# Patient Record
Sex: FEMALE | Race: WHITE | URBAN - METROPOLITAN AREA
[De-identification: names, ages, dates, MRNs, and addresses within clinical notes are randomized per-mention and may not be internally consistent; named-entity substitution may affect disease eponyms.]

---

## 2021-03-25 ENCOUNTER — OFFICE VISIT (OUTPATIENT)
Dept: INTERNAL MEDICINE CLINIC | Age: 26
End: 2021-03-25
Payer: COMMERCIAL

## 2021-03-25 VITALS
SYSTOLIC BLOOD PRESSURE: 122 MMHG | TEMPERATURE: 98.1 F | RESPIRATION RATE: 16 BRPM | DIASTOLIC BLOOD PRESSURE: 68 MMHG | WEIGHT: 124 LBS | HEIGHT: 64 IN | OXYGEN SATURATION: 98 % | HEART RATE: 74 BPM | BODY MASS INDEX: 21.17 KG/M2

## 2021-03-25 DIAGNOSIS — Z11.59 SCREENING FOR VIRAL DISEASE: ICD-10-CM

## 2021-03-25 DIAGNOSIS — K50.10 CROHN'S DISEASE OF COLON WITHOUT COMPLICATION (HCC): Primary | ICD-10-CM

## 2021-03-25 DIAGNOSIS — Z00.00 ROUTINE CHECK-UP: ICD-10-CM

## 2021-03-25 DIAGNOSIS — Z80.0 FAMILY HISTORY OF RECTAL CANCER: ICD-10-CM

## 2021-03-25 PROCEDURE — G8484 FLU IMMUNIZE NO ADMIN: HCPCS | Performed by: INTERNAL MEDICINE

## 2021-03-25 PROCEDURE — 90715 TDAP VACCINE 7 YRS/> IM: CPT | Performed by: INTERNAL MEDICINE

## 2021-03-25 PROCEDURE — G8420 CALC BMI NORM PARAMETERS: HCPCS | Performed by: INTERNAL MEDICINE

## 2021-03-25 PROCEDURE — G8428 CUR MEDS NOT DOCUMENT: HCPCS | Performed by: INTERNAL MEDICINE

## 2021-03-25 PROCEDURE — 90471 IMMUNIZATION ADMIN: CPT | Performed by: INTERNAL MEDICINE

## 2021-03-25 PROCEDURE — 99204 OFFICE O/P NEW MOD 45 MIN: CPT | Performed by: INTERNAL MEDICINE

## 2021-03-25 PROCEDURE — 1036F TOBACCO NON-USER: CPT | Performed by: INTERNAL MEDICINE

## 2021-03-25 RX ORDER — NORGESTIMATE AND ETHINYL ESTRADIOL 0.25-0.035
1 KIT ORAL DAILY
COMMUNITY
Start: 2021-03-07 | End: 2021-08-18 | Stop reason: SDUPTHER

## 2021-03-25 SDOH — ECONOMIC STABILITY: TRANSPORTATION INSECURITY
IN THE PAST 12 MONTHS, HAS THE LACK OF TRANSPORTATION KEPT YOU FROM MEDICAL APPOINTMENTS OR FROM GETTING MEDICATIONS?: NOT ASKED

## 2021-03-25 SDOH — ECONOMIC STABILITY: TRANSPORTATION INSECURITY
IN THE PAST 12 MONTHS, HAS LACK OF TRANSPORTATION KEPT YOU FROM MEETINGS, WORK, OR FROM GETTING THINGS NEEDED FOR DAILY LIVING?: NOT ASKED

## 2021-03-25 ASSESSMENT — PATIENT HEALTH QUESTIONNAIRE - PHQ9
SUM OF ALL RESPONSES TO PHQ QUESTIONS 1-9: 0
SUM OF ALL RESPONSES TO PHQ QUESTIONS 1-9: 0
SUM OF ALL RESPONSES TO PHQ9 QUESTIONS 1 & 2: 0
SUM OF ALL RESPONSES TO PHQ QUESTIONS 1-9: 0

## 2021-03-25 ASSESSMENT — ENCOUNTER SYMPTOMS
DIARRHEA: 0
NAUSEA: 0
COUGH: 0
SHORTNESS OF BREATH: 0
TROUBLE SWALLOWING: 0
RHINORRHEA: 0
SORE THROAT: 0
ABDOMINAL PAIN: 0

## 2021-03-25 NOTE — PROGRESS NOTES
Rafael Rivera (:  1995) is a 22 y.o. female, here for evaluation of the following chief complaint(s):    Established New Doctor      ASSESSMENT/PLAN:  1. Crohn's disease of colon without complication [facility])  Patient was diagnosed with Crohn's in  and briefly took medication. Her symptoms have been quiet although she may have had mild symptoms for a day earlier this year. Advised getting established with gastroenterology in [facility] Julisa Cook MD, Gastroenterology, [facility]  -     CBC; Future  2. Family history of rectal cancer  -     AFL - Julisa Cook MD, Gastroenterology, [facility]  3. Screening for viral disease  -     HIV-1 AND HIV-2 ANTIBODIES; Future  -     HEPATITIS C ANTIBODY; Future  4. Routine check-up  Tdap today, refer gyn to get established  -     Lipid Panel; Future  -     Comprehensive Metabolic Panel, Fasting; Future  -     Vitamin D 25 Hydroxy; Future      Return in about 1 year (around 3/25/2022). SUBJECTIVE/OBJECTIVE:  HPI   New patient, here to get established. Originally from [place], then lived in [place], and in [place] since 2020. She feels well. She was diagnosed with Crohn's disease in  and I was able to review the pathology report. Her symptoms have been very quiet for many years. She describes an episode of lower abdominal pain few months ago that lasted for about a day that was reminiscent of her Crohn's pain. She denies weight loss or blood in stool. She has about 2 bowel movements per day. Review of Systems   Constitutional: Negative for fatigue and fever. HENT: Negative for rhinorrhea, sore throat and trouble swallowing. Eyes: Negative for visual disturbance. Respiratory: Negative for cough and shortness of breath. Cardiovascular: Negative for chest pain and palpitations. Gastrointestinal: Negative for abdominal pain, diarrhea and nausea.    Genitourinary: Negative for decreased urine volume, dysuria and frequency. Musculoskeletal: Negative for arthralgias and myalgias. Skin: Negative for rash. Allergic/Immunologic: Negative for immunocompromised state. Neurological: Negative for dizziness, numbness and headaches. Hematological: Does not bruise/bleed easily. Psychiatric/Behavioral: Negative for dysphoric mood and sleep disturbance. The patient is not nervous/anxious. Past Medical History:   Diagnosis Date    Crohn's disease of colon with complication ([address]     dxd 10/13, briefly took meds, then sxs resolved       Current Outpatient Medications   Medication Sig Dispense Refill    CRISTOBAL 0.25-35 MG-MCG per tablet Take 1 tablet by mouth daily       No current facility-administered medications for this visit. Physical Exam  Vitals signs and nursing note reviewed. Constitutional:       General: She is not in acute distress. Appearance: She is well-developed. HENT:      Head: Normocephalic and atraumatic. Right Ear: External ear normal.      Left Ear: External ear normal.   Eyes:      General: No scleral icterus. Extraocular Movements: Extraocular movements intact. Neck:      Thyroid: No thyromegaly. Cardiovascular:      Rate and Rhythm: Normal rate and regular rhythm. Heart sounds: No murmur. Pulmonary:      Effort: No respiratory distress. Breath sounds: Normal breath sounds. No wheezing or rales. Abdominal:      General: Bowel sounds are normal. There is no distension. Palpations: Abdomen is soft. Tenderness: There is no abdominal tenderness. Musculoskeletal: Normal range of motion. Lymphadenopathy:      Cervical: No cervical adenopathy. Skin:     General: Skin is warm and dry. Neurological:      Mental Status: She is alert and oriented to person, place, and time. Cranial Nerves: No cranial nerve deficit. Sensory: No sensory deficit.       Coordination: Coordination normal.   Psychiatric:         Behavior: Behavior normal.       I have spent 45 minutes face to face with patient, greater than 50% counseling and/or coordinating care for    This note was generated completely or in part utilizing Dragon dictation speech recognition software. Occasionally, words are mistranscribed and despite editing, the text may contain inaccuracies due to incorrect word recognition. If further clarification is needed please contact the office at [phone]          An electronic signature was used to authenticate this note.     --Nedra Agustin MD

## 2021-08-16 ENCOUNTER — TELEPHONE (OUTPATIENT)
Dept: INTERNAL MEDICINE CLINIC | Age: 26
End: 2021-08-16

## 2021-08-16 ENCOUNTER — OFFICE VISIT (OUTPATIENT)
Dept: INTERNAL MEDICINE CLINIC | Age: 26
End: 2021-08-16
Payer: COMMERCIAL

## 2021-08-16 VITALS
DIASTOLIC BLOOD PRESSURE: 60 MMHG | WEIGHT: 130 LBS | TEMPERATURE: 98.2 F | HEART RATE: 64 BPM | BODY MASS INDEX: 22.31 KG/M2 | SYSTOLIC BLOOD PRESSURE: 104 MMHG

## 2021-08-16 DIAGNOSIS — H69.81 DYSFUNCTION OF RIGHT EUSTACHIAN TUBE: Primary | ICD-10-CM

## 2021-08-16 PROCEDURE — 99213 OFFICE O/P EST LOW 20 MIN: CPT | Performed by: INTERNAL MEDICINE

## 2021-08-16 PROCEDURE — G8427 DOCREV CUR MEDS BY ELIG CLIN: HCPCS | Performed by: INTERNAL MEDICINE

## 2021-08-16 PROCEDURE — 1036F TOBACCO NON-USER: CPT | Performed by: INTERNAL MEDICINE

## 2021-08-16 PROCEDURE — G8420 CALC BMI NORM PARAMETERS: HCPCS | Performed by: INTERNAL MEDICINE

## 2021-08-16 RX ORDER — USTEKINUMAB 90 MG/ML
INJECTION, SOLUTION SUBCUTANEOUS
COMMUNITY
Start: 2021-07-07

## 2021-08-16 RX ORDER — FLUTICASONE PROPIONATE 50 MCG
2 SPRAY, SUSPENSION (ML) NASAL DAILY
Qty: 1 BOTTLE | Refills: 11 | Status: SHIPPED | OUTPATIENT
Start: 2021-08-16 | End: 2022-08-22

## 2021-08-16 NOTE — TELEPHONE ENCOUNTER
----- Message from Efra Wellington sent at 8/16/2021 10:41 AM EDT -----  Subject: Appointment Request    Reason for Call: Ear Problem    QUESTIONS  Type of Appointment? Established Patient  Reason for appointment request? No appointments available during search  Additional Information for Provider? patient is experiencing pain in right   ear , when yawn it hurts and when blow nose sound like a squeaky sound   requesting an appointment soon as possible .  ---------------------------------------------------------------------------  --------------  CALL BACK INFO  What is the best way for the office to contact you? OK to leave message on   voicemail  Preferred Call Back Phone Number? [phone]  ---------------------------------------------------------------------------  --------------  SCRIPT ANSWERS  Relationship to Patient? Self  Did you have an injury or trauma within the past three days? No  Is your pain affecting your daily activities? No  Are you having fevers (100.4), chills or sweats? No  Are you experiencing new onset hearing loss? No  Did your symptoms begin within the last 2 weeks? Yes  Have you been diagnosed with, awaiting test results for, or told that you   are suspected of having COVID-19 (Coronavirus)? (If patient has tested   negative or was tested as a requirement for work, school, or travel and   not based on symptoms, answer no)? No  Do you currently have flu-like symptoms including fever or chills, cough,   shortness of breath, difficulty breathing, or new loss of taste or smell? No  Have you had close contact with someone with COVID-19 in the last 14 days? No  (Service Expert  click yes below to proceed with [facility] As Usual   Scheduling)?  Yes

## 2021-08-16 NOTE — PROGRESS NOTES
Lisa Redman (:  1995) is a 22 y.o. female, here for evaluation of the following chief complaint(s):    Otalgia (Right ear pain started over the weekend. Noticed \"squeaking\" in ear when blowing nos)      ASSESSMENT/PLAN:  1. Dysfunction of right eustachian tube  -     fluticasone (FLONASE) 50 MCG/ACT nasal spray; 2 sprays by Nasal route daily, Disp-1 Bottle, R-11Normal  Claritin - D or Zyrtec D OTC for next few days  Call if symptoms persist.  Return if symptoms worsen or fail to improve. SUBJECTIVE/OBJECTIVE:  HPI   Patient complains of right ear discomfort that started over the weekend. She states that she heard a squeaky sound in both ears last week. She did have some recent air travel out [facility].    She denies sore throat. She was having some yellow nasal discharge from the nose but it is now clear. She denies fever or chills. She denies hearing loss. She is unsure if she may have had ear tubes as a child. Past Medical History:   Diagnosis Date    [address]'s disease of colon with complication ([facility] [address]     dxd 10/13, briefly took meds, then sxs resolved       Current Outpatient Medications   Medication Sig Dispense Refill    STELARA 90 MG/ML SOSY prefilled syringe       fluticasone (FLONASE) 50 MCG/ACT nasal spray 2 sprays by Nasal route daily 1 Bottle 11    CRISTOBAL 0.25-35 MG-MCG per tablet Take 1 tablet by mouth daily       No current facility-administered medications for this visit. Physical Exam  Vitals and nursing note reviewed. Constitutional:       General: She is not in acute distress. Appearance: She is well-developed. She is not diaphoretic. HENT:      Right Ear: Ear canal and external ear normal. Tympanic membrane is scarred and bulging (mild). Left Ear: Tympanic membrane, ear canal and external ear normal.      Nose: Nose normal.      Mouth/Throat:      Pharynx: No oropharyngeal exudate. Eyes:      General:         Right eye: No discharge.          Left eye: No discharge. Conjunctiva/sclera: Conjunctivae normal.   Cardiovascular:      Rate and Rhythm: Normal rate and regular rhythm. Pulmonary:      Effort: No respiratory distress. Breath sounds: Normal breath sounds. Lymphadenopathy:      Cervical: No cervical adenopathy. Neurological:      Mental Status: She is alert and oriented to person, place, and time. This note was generated completely or in part utilizing Dragon dictation speech recognition software. Occasionally, words are mistranscribed and despite editing, the text may contain inaccuracies due to incorrect word recognition. If further clarification is needed please contact the office at [phone]          An electronic signature was used to authenticate this note.     --Laura Perez MD

## 2021-08-16 NOTE — TELEPHONE ENCOUNTER
----- Message from Citlali Signs sent at 8/16/2021 10:45 AM EDT -----  Subject: Message to Provider    QUESTIONS  Information for Provider? patient requesting if can get the booster shot   for covid vaccine patient already had both covid vaccine when get an appt. want to know if can get the booster vaccine   ---------------------------------------------------------------------------  --------------  CALL BACK INFO  What is the best way for the office to contact you? OK to leave message on   voicemail  Preferred Call Back Phone Number? [phone]  ---------------------------------------------------------------------------  --------------  SCRIPT ANSWERS  Relationship to Patient?  Self

## 2021-08-18 ENCOUNTER — TELEPHONE (OUTPATIENT)
Dept: INTERNAL MEDICINE CLINIC | Age: 26
End: 2021-08-18

## 2021-08-18 RX ORDER — NORGESTIMATE AND ETHINYL ESTRADIOL 0.25-0.035
1 KIT ORAL DAILY
Qty: 1 PACKET | Refills: 3 | Status: SHIPPED | OUTPATIENT
Start: 2021-08-18 | End: 2021-09-13

## 2021-08-18 NOTE — TELEPHONE ENCOUNTER
Will refill but please advise her to get established with GYN in [place] going forward ashlyn if pap due which it may be.

## 2021-08-18 NOTE — TELEPHONE ENCOUNTER
Patient is requesting the following prescription(s):  Last appointment: 8/16/2021  Next appointment: Not due for 1 year  Last refill: 03-      CRISTOBAL 0.25-35 MG-MCG per tablet Take 1 tablet by mouth daily     [id number] [address] [place] [address]. - P [phone] - F [phone]

## 2021-09-07 DIAGNOSIS — H69.81 DYSFUNCTION OF RIGHT EUSTACHIAN TUBE: ICD-10-CM

## 2021-09-07 RX ORDER — FLUTICASONE PROPIONATE 50 MCG
SPRAY, SUSPENSION (ML) NASAL
Qty: 16 G | Refills: 11 | OUTPATIENT
Start: 2021-09-07

## 2021-09-13 RX ORDER — NORGESTIMATE AND ETHINYL ESTRADIOL 0.25-0.035
KIT ORAL
Qty: 28 TABLET | Refills: 3 | Status: SHIPPED | OUTPATIENT
Start: 2021-09-13 | End: 2021-11-29

## 2021-10-08 ENCOUNTER — TELEPHONE (OUTPATIENT)
Dept: INTERNAL MEDICINE CLINIC | Age: 26
End: 2021-10-08

## 2021-10-08 NOTE — TELEPHONE ENCOUNTER
Spoke to patient about her mental health concerns and why she wants to see therapist. States 4 years ago, in college, had sensation where felt like cutting self, not suicidal, but also was going through a stressful transition, and thought would relieve pressure by cutting. Hasn't had those thoughts for a long time, but then recently again she did and it worried her. Did speak to her dad about it who is also a therapist. Her feeling is she is more anxious than depressed. She is hoping to find methods to help her \"cope\" including but not limited to medication.   She will get back to me with names of therapists on her insurance list and I will see if there's anyone there I know

## 2021-12-15 ENCOUNTER — OFFICE VISIT (OUTPATIENT)
Dept: GYNECOLOGY | Age: 26
End: 2021-12-15
Payer: COMMERCIAL

## 2021-12-15 VITALS
BODY MASS INDEX: 22.23 KG/M2 | RESPIRATION RATE: 17 BRPM | HEIGHT: 64 IN | OXYGEN SATURATION: 97 % | HEART RATE: 90 BPM | WEIGHT: 130.2 LBS | DIASTOLIC BLOOD PRESSURE: 68 MMHG | SYSTOLIC BLOOD PRESSURE: 102 MMHG

## 2021-12-15 DIAGNOSIS — Z01.419 WELL WOMAN EXAM WITH ROUTINE GYNECOLOGICAL EXAM: Primary | ICD-10-CM

## 2021-12-15 PROCEDURE — G8484 FLU IMMUNIZE NO ADMIN: HCPCS | Performed by: OBSTETRICS & GYNECOLOGY

## 2021-12-15 PROCEDURE — 99385 PREV VISIT NEW AGE 18-39: CPT | Performed by: OBSTETRICS & GYNECOLOGY

## 2021-12-15 RX ORDER — NORGESTIMATE AND ETHINYL ESTRADIOL 0.25-0.035
1 KIT ORAL DAILY
Qty: 84 TABLET | Refills: 3 | Status: SHIPPED | OUTPATIENT
Start: 2021-12-15 | End: 2022-02-17

## 2021-12-15 ASSESSMENT — ENCOUNTER SYMPTOMS
GASTROINTESTINAL NEGATIVE: 1
RESPIRATORY NEGATIVE: 1
EYES NEGATIVE: 1

## 2021-12-16 NOTE — PROGRESS NOTES
Subjective:      Patient ID: Lui Rivera is a 32 y.o. female. Patient is here for annual. Patient on ocps. Gynecologic Exam        Review of Systems   Constitutional: Negative. HENT: Negative. Eyes: Negative. Respiratory: Negative. Cardiovascular: Negative. Gastrointestinal: Negative. Genitourinary: Negative. Musculoskeletal: Negative. Skin: Negative. Neurological: Negative. Psychiatric/Behavioral: Negative. 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   Tobacco Use    Smoking status: Never Smoker    Smokeless tobacco: Never Used   Substance and Sexual Activity    Alcohol use: Not Currently    Drug use: Never    Sexual activity: Yes     Partners: Male   Other Topics Concern    Not on file   Social History Narrative    Met  at [address] [facility]     Financial Resource Strain:     Difficulty of Paying Living Expenses: Not on file   Food Insecurity:     Worried About [address] in the Last Year: Not on file    [place] of Food in the Last Year: Not on file   Transportation Needs:     Lack of Transportation (Medical): Not on file    Lack of Transportation (Non-Medical):  Not on file   Physical Activity:     Days of Exercise per Week: Not on file    Minutes of Exercise per Session: Not on file   Stress:     Feeling of Stress : Not on file   Social Connections:     Frequency of Communication with Friends and Family: Not on file    Frequency of Social Gatherings with Friends and Family: Not on file    Attends Confucianism Services: Not on file    Active Member of Clubs or Organizations: Not on file    Attends Club or Organization Meetings: Not on file    Marital Status: Not on file   Intimate Partner Violence:     Fear of Current or Ex-Partner: Not on file    Emotionally Abused: Not on file    Physically Abused: Not on file    Sexually Abused: Not on file   Housing Stability:     Unable to Pay for Housing in the Last Year: Not on file    Number of [place] in the Last Year: Not on file    Unstable Housing in the Last Year: Not on file     No Known Allergies  Outpatient Medications Marked as Taking for the 12/15/21 encounter (Office Visit) with Glenn Nicole MD   Medication Sig Dispense Refill    norgestimate-ethinyl estradiol ([address]) 0.25-35 MG-MCG per tablet Take 1 tablet by mouth daily 84 tablet 3    STELARA 90 MG/ML SOSY prefilled syringe        Family History   Problem Relation Age of Onset    Rectal Cancer Mother         dx'd late 45s   Emi Yan Lung Cancer Paternal Grandfather     Pancreatic Cancer Paternal Grandmother         ? /68 (Site: Right Upper Arm, Position: Sitting, Cuff Size: Medium Adult)   Pulse 90   Resp 17   Ht 5' 4\" (1.626 m)   Wt 130 lb 3.2 oz (59.1 kg)   LMP 12/07/2021   SpO2 97%   BMI 22.35 kg/m²       Objective:   Physical Exam  Constitutional:       Appearance: Normal appearance. She is well-developed and normal weight. HENT:      Head: Normocephalic. Nose: Nose normal.      Mouth/Throat:      Mouth: Mucous membranes are moist.      Pharynx: Oropharynx is clear. Eyes:      Pupils: Pupils are equal, round, and reactive to light. Neck:      Thyroid: No thyromegaly. Cardiovascular:      Rate and Rhythm: Normal rate and regular rhythm. Pulses: Normal pulses.       Heart sounds: Normal heart sounds. No murmur heard. No friction rub. No gallop. Pulmonary:      Effort: Pulmonary effort is normal. No respiratory distress. Breath sounds: Normal breath sounds. No stridor. No wheezing, rhonchi or rales. Chest:      Chest wall: No tenderness. Breasts:      Right: Normal. No swelling, bleeding, inverted nipple, mass, nipple discharge, skin change or tenderness. Left: Normal. No swelling, bleeding, inverted nipple, mass, nipple discharge, skin change or tenderness. Abdominal:      General: Bowel sounds are normal. There is no distension. Palpations: Abdomen is soft. There is no mass. Tenderness: There is no abdominal tenderness. There is no guarding or rebound. Hernia: No hernia is present. There is no hernia in the left inguinal area. Genitourinary:     General: Normal vulva. Exam position: Lithotomy position. Pubic Area: No rash. Labia:         Right: No rash, tenderness, lesion or injury. Left: No rash, tenderness, lesion or injury. Urethra: No prolapse, urethral pain, urethral swelling or urethral lesion. Vagina: No signs of injury and foreign body. No vaginal discharge, erythema, tenderness, bleeding, lesions or prolapsed vaginal walls. Cervix: No cervical motion tenderness, discharge, friability, lesion, erythema, cervical bleeding or eversion. Uterus: Not deviated, not enlarged, not fixed and not tender. Adnexa:         Right: No mass, tenderness or fullness. Left: No mass, tenderness or fullness. Rectum: No anal fissure or external hemorrhoid. Comments: Normal urethral meatus, normal urethra, nl bladder  Musculoskeletal:         General: No tenderness. Normal range of motion. Cervical back: Normal range of motion and neck supple. No rigidity. Lymphadenopathy:      Cervical: No cervical adenopathy. Lower Body: No right inguinal adenopathy. No left inguinal adenopathy.    Skin: General: Skin is warm and dry. Coloration: Skin is not pale. Findings: No erythema or rash. Neurological:      General: No focal deficit present. Mental Status: She is alert and oriented to person, place, and time. Mental status is at baseline. Deep Tendon Reflexes: Reflexes are normal and symmetric. Psychiatric:         Mood and Affect: Mood normal.         Behavior: Behavior normal.         Thought Content: Thought content normal.         Judgment: Judgment normal.         Assessment:      1. Annual  2. Birth control        Plan:      1. Pap, calcium, exercise  2.  Refill Kim for one year        Annelise Bello MD

## 2022-02-17 RX ORDER — NORGESTIMATE AND ETHINYL ESTRADIOL 0.25-0.035
KIT ORAL
Qty: 84 TABLET | Refills: 3 | Status: SHIPPED | OUTPATIENT
Start: 2022-02-17

## 2022-04-07 ENCOUNTER — OFFICE VISIT (OUTPATIENT)
Dept: INTERNAL MEDICINE CLINIC | Age: 27
End: 2022-04-07
Payer: COMMERCIAL

## 2022-04-07 VITALS
HEIGHT: 60 IN | WEIGHT: 132.8 LBS | SYSTOLIC BLOOD PRESSURE: 106 MMHG | DIASTOLIC BLOOD PRESSURE: 62 MMHG | RESPIRATION RATE: 17 BRPM | BODY MASS INDEX: 26.07 KG/M2 | HEART RATE: 69 BPM | OXYGEN SATURATION: 96 %

## 2022-04-07 DIAGNOSIS — Z13.220 SCREENING, LIPID: ICD-10-CM

## 2022-04-07 DIAGNOSIS — Z00.00 ROUTINE CHECK-UP: Primary | ICD-10-CM

## 2022-04-07 DIAGNOSIS — G24.5 EYE TWITCH: ICD-10-CM

## 2022-04-07 DIAGNOSIS — K50.10 CROHN'S DISEASE OF COLON WITHOUT COMPLICATION (HCC): ICD-10-CM

## 2022-04-07 PROCEDURE — 99395 PREV VISIT EST AGE 18-39: CPT | Performed by: INTERNAL MEDICINE

## 2022-04-07 SDOH — ECONOMIC STABILITY: FOOD INSECURITY: WITHIN THE PAST 12 MONTHS, THE FOOD YOU BOUGHT JUST DIDN'T LAST AND YOU DIDN'T HAVE MONEY TO GET MORE.: NEVER TRUE

## 2022-04-07 SDOH — ECONOMIC STABILITY: FOOD INSECURITY: WITHIN THE PAST 12 MONTHS, YOU WORRIED THAT YOUR FOOD WOULD RUN OUT BEFORE YOU GOT MONEY TO BUY MORE.: NEVER TRUE

## 2022-04-07 ASSESSMENT — SOCIAL DETERMINANTS OF HEALTH (SDOH): HOW HARD IS IT FOR YOU TO PAY FOR THE VERY BASICS LIKE FOOD, HOUSING, MEDICAL CARE, AND HEATING?: NOT HARD AT ALL

## 2022-04-07 ASSESSMENT — ENCOUNTER SYMPTOMS
NAUSEA: 0
DIARRHEA: 0
SORE THROAT: 0
RHINORRHEA: 0
TROUBLE SWALLOWING: 0
SHORTNESS OF BREATH: 0
ABDOMINAL PAIN: 0
COUGH: 0

## 2022-04-07 ASSESSMENT — PATIENT HEALTH QUESTIONNAIRE - PHQ9
SUM OF ALL RESPONSES TO PHQ QUESTIONS 1-9: 0
2. FEELING DOWN, DEPRESSED OR HOPELESS: 0
SUM OF ALL RESPONSES TO PHQ QUESTIONS 1-9: 0
1. LITTLE INTEREST OR PLEASURE IN DOING THINGS: 0
DEPRESSION UNABLE TO ASSESS: PT REFUSES
SUM OF ALL RESPONSES TO PHQ9 QUESTIONS 1 & 2: 0
SUM OF ALL RESPONSES TO PHQ QUESTIONS 1-9: 0
SUM OF ALL RESPONSES TO PHQ QUESTIONS 1-9: 0

## 2022-04-07 NOTE — PROGRESS NOTES
Barbie Porter (:  1995) is a 32 y.o. female, here for evaluation of the following chief complaint(s):    Follow-up      ASSESSMENT/PLAN:  1. Routine check-up  Preventive health care up-to-date. We will send her information on genetic cancer screening due to her family history. 2. Eye twitch  -     TSH; Future  -     Magnesium; Future  -     CK; Future  3. Screening, lipid  -     Lipid Panel; Future  4. Crohn's disease of colon without complication ([address]  Doing very well on Stelara. -     Comprehensive Metabolic Panel; Future  -     Vitamin B12; Future  -     Vitamin D 25 Hydroxy; Future  -     CBC; Future  -     Iron and TIBC; Future      Return in about 1 year (around 2023). SUBJECTIVE/OBJECTIVE:  HPI   Patient is here for routine annual visit. She has taken up running and plans to do a big run at [facility] in November. She has been working out with the [employer]. 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 She may need some forms done and I told her I would do them. She complains of her left eyebrow twitching from time to time. It has been going on since at least March. It is annoying. She states that she is not under increased stress. Review of Systems   Constitutional: Negative for fatigue and fever. HENT: Negative for rhinorrhea, sore throat and trouble swallowing. Eyes: Negative for visual disturbance. Respiratory: Negative for cough and shortness of breath. Cardiovascular: Negative for chest pain and palpitations. Gastrointestinal: Negative for abdominal pain, diarrhea and nausea. Genitourinary: Negative for decreased urine volume, dysuria and frequency. Musculoskeletal: Negative for arthralgias and myalgias. Skin: Negative for rash. Allergic/Immunologic: Negative for immunocompromised state. Neurological: Negative for dizziness, numbness and headaches. Hematological: Does not bruise/bleed easily.    Psychiatric/Behavioral: Negative for dysphoric mood and sleep disturbance. The patient is not nervous/anxious. Past Medical History:   Diagnosis Date    Crohn's disease of colon with complication ([address]     dxd 10/13, currently on stelara       Current Outpatient Medications   Medication Sig Dispense Refill    CRISTOBAL 0.25-35 MG-MCG per tablet TAKE 1 TABLET BY MOUTH EVERY DAY 84 tablet 3    STELARA 90 MG/ML SOSY prefilled syringe       fluticasone (FLONASE) 50 MCG/ACT nasal spray 2 sprays by Nasal route daily 1 Bottle 11     No current facility-administered medications for this visit. Physical Exam  Vitals and nursing note reviewed. Constitutional:       General: She is not in acute distress. Appearance: She is well-developed. HENT:      Head: Normocephalic and atraumatic. Right Ear: External ear normal.      Left Ear: External ear normal.   Eyes:      General: No scleral icterus. Extraocular Movements: Extraocular movements intact. Neck:      Thyroid: No thyromegaly. Cardiovascular:      Rate and Rhythm: Normal rate and regular rhythm. Heart sounds: No murmur heard. Pulmonary:      Effort: No respiratory distress. Breath sounds: Normal breath sounds. No wheezing or rales. Abdominal:      General: Bowel sounds are normal. There is no distension. Palpations: Abdomen is soft. Tenderness: There is no abdominal tenderness. Musculoskeletal:         General: Normal range of motion. Lymphadenopathy:      Cervical: No cervical adenopathy. Skin:     General: Skin is warm and dry. Neurological:      Mental Status: She is alert and oriented to person, place, and time. Cranial Nerves: No cranial nerve deficit. Sensory: No sensory deficit. Coordination: Coordination normal.   Psychiatric:         Behavior: Behavior normal.               This note was generated completely or in part utilizing Dragon dictation speech recognition software.   Occasionally, words are mistranscribed and despite editing, the text may contain inaccuracies due to incorrect word recognition. If further clarification is needed please contact the office at [phone]          An electronic signature was used to authenticate this note.     --Imani Manriquez MD

## 2022-08-21 DIAGNOSIS — H69.81 DYSFUNCTION OF RIGHT EUSTACHIAN TUBE: ICD-10-CM

## 2022-08-22 RX ORDER — FLUTICASONE PROPIONATE 50 MCG
SPRAY, SUSPENSION (ML) NASAL
Qty: 3 EACH | Refills: 0 | Status: SHIPPED | OUTPATIENT
Start: 2022-08-22

## 2022-08-22 NOTE — TELEPHONE ENCOUNTER
Last appointment: 4/7/2022  Next appointment: Visit date not found  Last refill: 8/16/21   Appointment not due for >6 months.

## 2022-11-16 DIAGNOSIS — H69.81 DYSFUNCTION OF RIGHT EUSTACHIAN TUBE: ICD-10-CM

## 2022-11-16 RX ORDER — FLUTICASONE PROPIONATE 50 MCG
SPRAY, SUSPENSION (ML) NASAL
Qty: 16 G | Refills: 3 | Status: SHIPPED | OUTPATIENT
Start: 2022-11-16

## 2022-11-16 NOTE — TELEPHONE ENCOUNTER
Last appointment: 4/7/2022  Next appointment: Visit date not found  Last refill: 8/22/22   Appointment not due for >6 months.

## 2022-12-15 ENCOUNTER — OFFICE VISIT (OUTPATIENT)
Dept: GYNECOLOGY | Age: 27
End: 2022-12-15

## 2022-12-15 VITALS
HEART RATE: 75 BPM | RESPIRATION RATE: 17 BRPM | WEIGHT: 139.4 LBS | OXYGEN SATURATION: 99 % | BODY MASS INDEX: 23.8 KG/M2 | DIASTOLIC BLOOD PRESSURE: 72 MMHG | SYSTOLIC BLOOD PRESSURE: 106 MMHG | HEIGHT: 64 IN

## 2022-12-15 DIAGNOSIS — Z01.419 WELL WOMAN EXAM WITH ROUTINE GYNECOLOGICAL EXAM: Primary | ICD-10-CM

## 2022-12-15 RX ORDER — NORGESTIMATE AND ETHINYL ESTRADIOL 0.25-0.035
1 KIT ORAL DAILY
Qty: 84 TABLET | Refills: 3 | Status: SHIPPED | OUTPATIENT
Start: 2022-12-15

## 2022-12-18 ASSESSMENT — ENCOUNTER SYMPTOMS
ALLERGIC/IMMUNOLOGIC NEGATIVE: 1
GASTROINTESTINAL NEGATIVE: 1
RESPIRATORY NEGATIVE: 1
EYES NEGATIVE: 1

## 2022-12-19 NOTE — PROGRESS NOTES
Subjective:      Patient ID: Daryl De is a 32 y.o. female. Patient is here for annual. Needs Ocps. Gynecologic Exam      Review of Systems   Constitutional: Negative. HENT: Negative. Eyes: Negative. Respiratory: Negative. Cardiovascular: Negative. Gastrointestinal: Negative. Endocrine: Negative. Genitourinary: Negative. Musculoskeletal: Negative. Skin: Negative. Allergic/Immunologic: Negative. Neurological: Negative. Hematological: Negative. Psychiatric/Behavioral: Negative.        Date of Birth 1995  Past Medical History:   Diagnosis Date    Crohn's disease of colon with complication ([facility] [address]     dxd 10/13, currently on stelara     Past Surgical History:   Procedure Laterality Date    COLONOSCOPY  2013    COLONOSCOPY  10/2021    fu 5 yrs    TONSILLECTOMY      WISDOM TOOTH EXTRACTION       OB History    Para Term  AB Living   0 0 0 0 0 0   SAB IAB Ectopic Molar Multiple Live Births   0 0 0 0 0 0     Social History     Socioeconomic History    Marital status:      Spouse name: Not on file    Number of children: 0    Years of education: Not on file    Highest education level: Not on file   Occupational History    Occupation: working in [employer]   Tobacco Use    Smoking status: Never    Smokeless tobacco: Never   Substance and Sexual Activity    Alcohol use: Not Currently    Drug use: Never    Sexual activity: Yes     Partners: Male   Other Topics Concern    Not on file   Social History Narrative    Met  at [address] [facility]     Financial Resource Strain: Low Risk     Difficulty of Paying Living Expenses: Not hard at all   Food Insecurity: No Food Insecurity    Worried About Running Out of Food in the Last Year: Never true    Ran Out of Food in the Last Year: Never true   Transportation Needs: Not on file   Physical Activity: Not on file   Stress: Not on file   Social Connections: Not on file   Intimate Abdomen is soft. There is no mass. Tenderness: There is no abdominal tenderness. There is no guarding or rebound. Hernia: No hernia is present. There is no hernia in the left inguinal area or right inguinal area. Genitourinary:     General: Normal vulva. Exam position: Lithotomy position. Pubic Area: No rash. Labia:         Right: No rash, tenderness, lesion or injury. Left: No rash, tenderness, lesion or injury. Urethra: No prolapse, urethral pain, urethral swelling or urethral lesion. Vagina: No signs of injury and foreign body. No vaginal discharge, erythema, tenderness, bleeding, lesions or prolapsed vaginal walls. Cervix: No cervical motion tenderness, discharge, friability, lesion, erythema, cervical bleeding or eversion. Uterus: Not deviated, not enlarged, not fixed, not tender and no uterine prolapse. Adnexa:         Right: No mass, tenderness or fullness. Left: No mass, tenderness or fullness. Rectum: No anal fissure or external hemorrhoid. Comments: Normal urethral meatus, normal urethra, nl bladder  Musculoskeletal:         General: No swelling, tenderness, deformity or signs of injury. Normal range of motion. Cervical back: Normal range of motion and neck supple. No rigidity. Lymphadenopathy:      Cervical: No cervical adenopathy. Lower Body: No right inguinal adenopathy. No left inguinal adenopathy. Skin:     General: Skin is warm and dry. Coloration: Skin is not jaundiced or pale. Findings: No bruising, erythema, lesion or rash. Neurological:      General: No focal deficit present. Mental Status: She is alert and oriented to person, place, and time. Mental status is at baseline. Deep Tendon Reflexes: Reflexes are normal and symmetric. Psychiatric:         Mood and Affect: Mood normal.         Behavior: Behavior normal.         Thought Content:  Thought content normal. Judgment: Judgment normal.       Assessment:   Annual  Birth control      Plan:      Pap, calcium, exercise  Refill for one year.         Benja Ruggiero MD

## 2023-01-07 ENCOUNTER — APPOINTMENT (OUTPATIENT)
Dept: CT IMAGING | Age: 28
End: 2023-01-07
Payer: COMMERCIAL

## 2023-01-07 ENCOUNTER — APPOINTMENT (OUTPATIENT)
Dept: GENERAL RADIOLOGY | Age: 28
End: 2023-01-07
Payer: COMMERCIAL

## 2023-01-07 ENCOUNTER — HOSPITAL ENCOUNTER (EMERGENCY)
Age: 28
Discharge: HOME | End: 2023-01-07
Attending: EMERGENCY MEDICINE
Payer: COMMERCIAL

## 2023-01-07 DIAGNOSIS — R51.9 NONINTRACTABLE HEADACHE, UNSPECIFIED CHRONICITY PATTERN, UNSPECIFIED HEADACHE TYPE: Primary | ICD-10-CM

## 2023-01-07 LAB
A/G RATIO: 1.7 (ref 1.1–2.2)
ALBUMIN SERPL-MCNC: 4.5 G/DL (ref 3.4–5)
ALP BLD-CCNC: 66 U/L (ref 40–129)
ALT SERPL-CCNC: 8 U/L (ref 10–40)
ANION GAP SERPL CALCULATED.3IONS-SCNC: 11 MMOL/L (ref 3–16)
AST SERPL-CCNC: 11 U/L (ref 15–37)
BASOPHILS ABSOLUTE: 0.1 K/UL (ref 0–0.2)
BASOPHILS RELATIVE PERCENT: 0.8 %
BILIRUB SERPL-MCNC: <0.2 MG/DL (ref 0–1)
BUN BLDV-MCNC: 15 MG/DL (ref 7–20)
CALCIUM SERPL-MCNC: 9.8 MG/DL (ref 8.3–10.6)
CHLORIDE BLD-SCNC: 103 MMOL/L (ref 99–110)
CO2: 24 MMOL/L (ref 21–32)
CREAT SERPL-MCNC: 0.9 MG/DL (ref 0.6–1.1)
EOSINOPHILS ABSOLUTE: 0.2 K/UL (ref 0–0.6)
EOSINOPHILS RELATIVE PERCENT: 2.7 %
GFR SERPL CREATININE-BSD FRML MDRD: >60 ML/MIN/{1.73_M2}
GLUCOSE BLD-MCNC: 113 MG/DL (ref 70–99)
GLUCOSE BLD-MCNC: 149 MG/DL (ref 70–99)
HCG QUALITATIVE: NEGATIVE
HCT VFR BLD CALC: 42.4 % (ref 36–48)
HEMOGLOBIN: 14.2 G/DL (ref 12–16)
INR BLD: 0.95 (ref 0.87–1.14)
LYMPHOCYTES ABSOLUTE: 3.4 K/UL (ref 1–5.1)
LYMPHOCYTES RELATIVE PERCENT: 40.5 %
MCH RBC QN AUTO: 29.1 PG (ref 26–34)
MCHC RBC AUTO-ENTMCNC: 33.6 G/DL (ref 31–36)
MCV RBC AUTO: 86.8 FL (ref 80–100)
MONOCYTES ABSOLUTE: 0.8 K/UL (ref 0–1.3)
MONOCYTES RELATIVE PERCENT: 10 %
NEUTROPHILS ABSOLUTE: 3.8 K/UL (ref 1.7–7.7)
NEUTROPHILS RELATIVE PERCENT: 46 %
PDW BLD-RTO: 13.1 % (ref 12.4–15.4)
PERFORMED ON: ABNORMAL
PLATELET # BLD: 258 K/UL (ref 135–450)
PMV BLD AUTO: 9.3 FL (ref 5–10.5)
POTASSIUM REFLEX MAGNESIUM: 4.5 MMOL/L (ref 3.5–5.1)
PROTHROMBIN TIME: 12.6 SEC (ref 11.7–14.5)
RBC # BLD: 4.88 M/UL (ref 4–5.2)
SODIUM BLD-SCNC: 138 MMOL/L (ref 136–145)
TOTAL PROTEIN: 7.1 G/DL (ref 6.4–8.2)
TROPONIN: <0.01 NG/ML
WBC # BLD: 8.4 K/UL (ref 4–11)

## 2023-01-07 PROCEDURE — 6370000000 HC RX 637 (ALT 250 FOR IP): Performed by: EMERGENCY MEDICINE

## 2023-01-07 PROCEDURE — 6360000004 HC RX CONTRAST MEDICATION: Performed by: EMERGENCY MEDICINE

## 2023-01-07 PROCEDURE — 99285 EMERGENCY DEPT VISIT HI MDM: CPT

## 2023-01-07 PROCEDURE — 85025 COMPLETE CBC W/AUTO DIFF WBC: CPT

## 2023-01-07 PROCEDURE — 85610 PROTHROMBIN TIME: CPT

## 2023-01-07 PROCEDURE — 70498 CT ANGIOGRAPHY NECK: CPT

## 2023-01-07 PROCEDURE — 6360000002 HC RX W HCPCS: Performed by: EMERGENCY MEDICINE

## 2023-01-07 PROCEDURE — 84484 ASSAY OF TROPONIN QUANT: CPT

## 2023-01-07 PROCEDURE — 2580000003 HC RX 258: Performed by: EMERGENCY MEDICINE

## 2023-01-07 PROCEDURE — 80053 COMPREHEN METABOLIC PANEL: CPT

## 2023-01-07 PROCEDURE — 70450 CT HEAD/BRAIN W/O DYE: CPT

## 2023-01-07 PROCEDURE — 71045 X-RAY EXAM CHEST 1 VIEW: CPT

## 2023-01-07 PROCEDURE — 93005 ELECTROCARDIOGRAM TRACING: CPT | Performed by: EMERGENCY MEDICINE

## 2023-01-07 PROCEDURE — 84703 CHORIONIC GONADOTROPIN ASSAY: CPT

## 2023-01-07 RX ORDER — DIPHENHYDRAMINE HYDROCHLORIDE 50 MG/ML
25 INJECTION INTRAMUSCULAR; INTRAVENOUS ONCE
Status: COMPLETED | OUTPATIENT
Start: 2023-01-07 | End: 2023-01-07

## 2023-01-07 RX ORDER — ACETAMINOPHEN 500 MG
1000 TABLET ORAL ONCE
Status: COMPLETED | OUTPATIENT
Start: 2023-01-07 | End: 2023-01-07

## 2023-01-07 RX ORDER — METOCLOPRAMIDE HYDROCHLORIDE 5 MG/ML
10 INJECTION INTRAMUSCULAR; INTRAVENOUS ONCE
Status: COMPLETED | OUTPATIENT
Start: 2023-01-07 | End: 2023-01-07

## 2023-01-07 RX ORDER — 0.9 % SODIUM CHLORIDE 0.9 %
1000 INTRAVENOUS SOLUTION INTRAVENOUS ONCE
Status: COMPLETED | OUTPATIENT
Start: 2023-01-07 | End: 2023-01-07

## 2023-01-07 RX ADMIN — DIPHENHYDRAMINE HYDROCHLORIDE 25 MG: 50 INJECTION, SOLUTION INTRAMUSCULAR; INTRAVENOUS at 20:12

## 2023-01-07 RX ADMIN — METOCLOPRAMIDE HYDROCHLORIDE 10 MG: 5 INJECTION INTRAMUSCULAR; INTRAVENOUS at 20:12

## 2023-01-07 RX ADMIN — ACETAMINOPHEN 1000 MG: 500 TABLET, FILM COATED ORAL at 20:11

## 2023-01-07 RX ADMIN — IOPAMIDOL 100 ML: 755 INJECTION, SOLUTION INTRAVENOUS at 18:59

## 2023-01-07 RX ADMIN — SODIUM CHLORIDE 1000 ML: 0.9 INJECTION, SOLUTION INTRAVENOUS at 20:13

## 2023-01-07 ASSESSMENT — PAIN DESCRIPTION - DESCRIPTORS: DESCRIPTORS: ACHING

## 2023-01-07 ASSESSMENT — PAIN DESCRIPTION - LOCATION: LOCATION: HEAD

## 2023-01-07 ASSESSMENT — PAIN SCALES - GENERAL: PAINLEVEL_OUTOF10: 3

## 2023-01-07 ASSESSMENT — PAIN DESCRIPTION - PAIN TYPE: TYPE: ACUTE PAIN

## 2023-01-07 ASSESSMENT — PAIN - FUNCTIONAL ASSESSMENT
PAIN_FUNCTIONAL_ASSESSMENT: ACTIVITIES ARE NOT PREVENTED
PAIN_FUNCTIONAL_ASSESSMENT: 0-10

## 2023-01-07 NOTE — ED PROVIDER NOTES
[address]      Pt Name: Trish Thomas  MRN: [[id number]]  [place] 1995  Date of evaluation: 1/7/2023  Provider: Uyen Charles, [address] [place] [address]       Chief Complaint   Patient presents with    Headache    Other     R hand tingling           HISTORY OF PRESENT ILLNESS   (Location/Symptom, Timing/Onset, Context/Setting, Quality, Duration, Modifying Factors, Severity)  Note limiting factors. Trish Thomas is a 32 y.o. female who presents to the emergency department with a complaint of onset of a headache today at 4:30 PM.  She describes a sharp throbbing pain in the right parietal area that radiates to the right occipital area. Pain has been constant since the onset and gradually worsening. At approximately 5:30 PM she developed some tingling in her left hand, predominantly in the second third and fourth fingers. No numbness in the proximal arm. She denies any weakness, difficulty speaking. However, she was reading a book at the onset of her symptoms and noticed that the words on the page seemed a little bit wavy. She denies any visual loss. She denies any photophobia. No neck pain or stiffness. She had a similar occurrence approximately 4 days ago in which she had a headache in the same location that lasted for approximately 3 to 4 hours. There was no associated numbness at that time. She denies any prior history of migraine headaches. She does take birth control pills. She does not smoke. She denies any history of hypertension, hyperlipidemia or diabetes. She denies any family history of migraines. She denies any family history of stroke. She denies any recent fall trauma injury, cough or cold symptoms. She denies any dysuria hematuria frequency urgency. She denies any chest pain heaviness pressure or tightness. She denies any diaphoresis. Medical history is significant for Crohn's disease but she does not take any steroids. She does take Stelara. Nursing Notes were reviewed. HPI        REVIEW OF SYSTEMS    (2-9 systems for level 4, 10 or more for level 5)       Constitutional: Negative for fever or chills. HENT: Negative for rhinorrhea and sore throat. Eyes: Negative for redness or drainage. Respiratory: Negative for shortness of breath or dyspnea on exertion. Cardiovascular: Negative for chest pain. Gastrointestinal: Negative for abdominal pain. Negative for vomiting or diarrhea. Genitourinary: Negative for flank pain. Negative for dysuria. Negative for hematuria. Musculoskeletal:  Negative edema. All systems are reviewed and are negative except for those listed above in the history of present illness and ROS. PAST MEDICAL HISTORY     Past Medical History:   Diagnosis Date    Crohn's disease of colon with complication ([facility] [address]     dxd 10/13, currently on stelara         SURGICAL HISTORY       Past Surgical History:   Procedure Laterality Date    COLONOSCOPY  2013    COLONOSCOPY  10/2021    fu 5 yrs    TONSILLECTOMY      WISDOM TOOTH EXTRACTION           CURRENT MEDICATIONS       Discharge Medication List as of 1/7/2023 10:25 PM        CONTINUE these medications which have NOT CHANGED    Details   CRISTOBAL 0.25-35 MG-MCG per tablet Take 1 tablet by mouth daily, Disp-84 tablet, R-3, DAWNormal      fluticasone (FLONASE) 50 MCG/ACT nasal spray SPRAY 2 SPRAYS BY NASAL ROUTE EVERY DAY, Disp-16 g, R-3Normal      STELARA 90 MG/ML SOSY prefilled syringe DAWHistorical Med             ALLERGIES     Patient has no known allergies.     FAMILY HISTORY       Family History   Problem Relation Age of Onset    Rectal Cancer Mother         dx'd late 45s    Pancreatic Cancer Maternal Grandmother     Lung Cancer Paternal Grandfather           SOCIAL HISTORY       Social History     Socioeconomic History    Marital status:     Number of children: 0   Occupational History    Occupation: working in [employer]   Tobacco Use    Smoking status: Never    Smokeless tobacco: Never   Vaping Use    Vaping Use: Never used   Substance and Sexual Activity    Alcohol use: Not Currently    Drug use: Never    Sexual activity: Yes     Partners: Male   Social History Narrative    Met  at [facility]     Social Determinants of Health     Financial Resource Strain: Low Risk     Difficulty of Paying Living Expenses: Not hard at all   Food Insecurity: No Food Insecurity    Worried About [address] in the Last Year: Never true    [address] [facility] [address] in the Last Year: Never true       SCREENINGS   NIH Stroke Scale  Interval: Baseline  Level of Consciousness (1a): Alert  LOC Questions (1b): Answers both correctly  LOC Commands (1c): Performs both tasks correctly  Best Gaze (2): Normal  Visual (3): No visual loss  Facial Palsy (4): Normal symmetrical movement  Motor Arm, Left (5a): No drift  Motor Arm, Right (5b): No drift  Motor Leg, Left (6a): No drift  Motor Leg, Right (6b): No drift  Limb Ataxia (7): Absent  Sensory (8): Normal  Best Language (9): No aphasia  Dysarthria (10): Normal  Extinction and Inattention (11): No abnormality  Total: 0Glasgow Coma Scale  Eye Opening: Spontaneous  Best Verbal Response: Oriented  Best Motor Response: Obeys commands  [place] Coma Scale Score: 15        PHYSICAL EXAM    (up to 7 for level 4, 8 or more for level 5)     ED Triage Vitals [01/07/23 1822]   BP Temp Temp src Heart Rate Resp SpO2 Height Weight   (!) 144/84 -- -- 73 18 97 % 5' 4\" (1.626 m) 119 lb (54 kg)         Physical Exam   Constitutional: Awake and alert. Very pleasant. Slightly anxious about her symptoms. Head: No visible evidence of trauma. Normocephalic. Eyes: Pupils equal and reactive. Pulls are 3 mm and reactive bilaterally. No photophobia. Conjunctiva normal.  No visual field deficits. HENT: Oral mucosa moist.  Airway patent. Pharynx without erythema. Nares were clear. Neck:  Soft and supple. Nontender.   Heart:  Regular rate and rhythm. No murmur. Lungs:  Clear to auscultation. No wheezes, rales, or ronchi. No conversational dyspnea or accessory muscle use. Chest: Chest wall non-tender. No evidence of trauma. Abdomen:  Soft, nondistended, bowel sounds present. Nontender. No guarding rigidity or rebound. No masses. Musculoskeletal: Extremities non-tender with full range of motion. Radial and dorsalis pedis pulses were intact. No calf tenderness erythema or edema. Neurological: Alert and oriented x3. GCS 15. Cranial nerves II through XII were intact. No facial droop. No dysarthria. No aphasia. No pronator drift. No acute focal motor or sensory deficits. Negative finger-to-nose. Negative heel-to-shin. Deep tendon reflexes were 2/4 in the upper and lower extremities bilaterally. Gait steady. No visual field deficits. NIH stroke scale was 0. Although she does have some paresthesias in the left second third and fourth fingers there is no evidence of any neurological deficit. Skin: Skin is warm and dry. No rash. Lymphatic:  No lympadenopathy. Psychiatric: Normal mood and affect. Behavior is normal.         DIAGNOSTIC RESULTS     EKG: All EKG's are interpreted by me, the Emergency Department Physician, who either signs or Co-signs this chart in the absence of a cardiologist.    Sinus bradycardia. Rate 52. Parable 146 ms. QRS duration 88 ms. QTc 401 ms. R axis 66 degrees. No ST elevation. Otherwise normal EKG. RADIOLOGY:   Non-plain film images such as CT, Ultrasound and MRI are read by the radiologist. Plain radiographic images are visualized and preliminarily interpreted by the emergency physician with the below findings:        Interpretation per the Radiologist below, if available at the time of this note:    XR CHEST PORTABLE   Final Result   1. No acute cardiopulmonary abnormalities. CTA HEAD NECK W CONTRAST   Final Result   1. Normal right internal carotid artery. .   2. Normal left internal carotid artery   3. Normal vertebral arteries. 4. Normal intracranial circulation. CT HEAD WO CONTRAST   Final Result   1. Normal brain      Critical finding was reported to referring provider Physician: Jayjay Mcclure   By telephone 3 radiology at 6:51 PM on 1/7/2023            ED BEDSIDE ULTRASOUND:   Performed by ED Physician - none    LABS:  Labs Reviewed   COMPREHENSIVE METABOLIC PANEL W/ REFLEX TO MG FOR LOW K - Abnormal; Notable for the following components:       Result Value    Glucose 113 (*)     ALT 8 (*)     AST 11 (*)     All other components within normal limits   POCT GLUCOSE - Abnormal; Notable for the following components:    POC Glucose 149 (*)     All other components within normal limits   CBC WITH AUTO DIFFERENTIAL   TROPONIN   PROTIME-INR   HCG, SERUM, QUALITATIVE   POCT GLUCOSE       All other labs were within normal range or not returned as of this dictation. EMERGENCY DEPARTMENT COURSE and DIFFERENTIAL DIAGNOSIS/ MEDICAL DECISION MAKING:   Vitals:    Vitals:    01/07/23 1822 01/07/23 2315   BP: (!) 144/84 113/78   Pulse: 73 80   Resp: 18    SpO2: 97%    Weight: 119 lb (54 kg)    Height: 5' 4\" (1.626 m)          JOAQUIM      Presents with a headache that began at 4:30 PM.  Prior to arrival she had some onset of some paresthesias in the left second and third and fourth fingers. However, there is no motor or sensory deficit. No visual field deficits but she did have some visual disturbance at the onset of the headache. NIH stroke scale is 0. Stroke alert was initiated. A call was placed to the [facility] stroke team.    Given the patient's clinical presentation and sequence of symptoms, I suspect that this likely is a complex migraine. However we will complete the work-up. Based on NIH stroke scale of 0, likelihood of alternative diagnosis, and no disabling deficits, the patient is not a candidate for thrombolytic therapy.     She was given normal saline 1 L bolus, Reglan 10 mg IV, and Benadryl 15 mg IV. Is this patient to be included in the SEP-1 Core Measure due to severe sepsis or septic shock? No   Exclusion criteria - the patient is NOT to be included for SEP-1 Core Measure due to: Infection is not suspected      REASSESSMENT/ MEDICAL DECISION MAKING        6:50 PM: At the time of change of shift, test results are pending as well as discussion with [facility] stroke team.  Care will be transferred to the oncoming physician for follow-up on test results and final disposition of the patient. CRITICAL CARE TIME   Total Critical Care time was 0 minutes, excluding separately reportable procedures. There was a high probability of clinically significant/life threatening deterioration in the patient's condition which required my urgent intervention. CONSULTS:  IP CONSULT TO PHARMACY  PHARMACY TO CHANGE BASE FLUIDS    PROCEDURES:  Unless otherwise noted below, none     Procedures        FINAL IMPRESSION      1. Nonintractable headache, unspecified chronicity pattern, unspecified headache type          DISPOSITION/PLAN   DISPOSITION Decision To Discharge 01/07/2023 10:14:15 PM      PATIENT REFERRED TO:  [facility] [id number] [phone]  Schedule an appointment as soon as possible for a visit in 1 week      DISCHARGE MEDICATIONS:  Discharge Medication List as of 1/7/2023 10:25 PM        Controlled Substances Monitoring:     No flowsheet data found. (Please note that portions of this note were completed with a voice recognition program.  Efforts were made to edit the dictations but occasionally words are mis-transcribed. )    [address] DO (electronically signed)  Attending Emergency Physician           Kaz Espitia,   01/07/23 Fred Mendez DO  01/10/23 0728

## 2023-01-08 ENCOUNTER — TELEPHONE (OUTPATIENT)
Dept: INTERNAL MEDICINE CLINIC | Age: 28
End: 2023-01-08

## 2023-01-08 VITALS
HEIGHT: 64 IN | DIASTOLIC BLOOD PRESSURE: 78 MMHG | WEIGHT: 119 LBS | RESPIRATION RATE: 18 BRPM | BODY MASS INDEX: 20.32 KG/M2 | OXYGEN SATURATION: 97 % | HEART RATE: 80 BPM | SYSTOLIC BLOOD PRESSURE: 113 MMHG

## 2023-01-08 LAB
EKG ATRIAL RATE: 52 BPM
EKG DIAGNOSIS: NORMAL
EKG P AXIS: 18 DEGREES
EKG P-R INTERVAL: 146 MS
EKG Q-T INTERVAL: 432 MS
EKG QRS DURATION: 88 MS
EKG QTC CALCULATION (BAZETT): 401 MS
EKG R AXIS: 66 DEGREES
EKG T AXIS: 58 DEGREES
EKG VENTRICULAR RATE: 52 BPM

## 2023-01-08 PROCEDURE — 93010 ELECTROCARDIOGRAM REPORT: CPT | Performed by: INTERNAL MEDICINE

## 2023-01-08 NOTE — ED PROVIDER NOTES
Emergency Department Attending Provider Note  Location: [address] [place] [address]  1/7/2023     Patient Identification  Barbara Waller is a 32 y.o. female      Barbara Waller was evaluated in the Emergency Department for atypical headache and transient paresthesias of the left fingertips that has resolved by the time of signout to my care. Patient was seen by my colleague and noted to have NIH stroke scale of 0. No history of headaches similar to this however so was sent for CT and CTA. Patient's care signed out with pending imaging. . Although initial history and physical exam information was obtained by SUNDAR/NPP/MD/DO (who also dictated a record of this visit), I personally saw the patient and performed a substantive portion of the visit including all aspects of the medical decision making. Patient seen and evaluated. Relevant records reviewed. I reassessed the patient and she was given a standard headache cocktail medications including IV fluids Reglan Benadryl Tylenol. On reassessment her headache is improved significantly almost resolved. She has no neuro symptoms otherwise. Her CT and CTA did not show any evidence of any acute process. She has no risk factors for stroke and her ABCD 2 score would be low. I did consider admission and stroke but think that the risk outweighs the benefit of admission. I did consult neurology at [facility] and spoke with Dr. Hema Jung. Who agrees patient would be appropriate for outpatient follow-up. I discussed return precautions with the patient as well as supportive care measures. Patient agreeable to plan expressed understanding of plan. I, Dr. Lance Jaffe, am the primary clinician of record. I personally saw the patient and independently provided 0 minutes of non-concurrent critical care out of the total shared critical care time provided.     This chart was generated in part by using Dragon Dictation system and may contain errors related to that system including errors in grammar, punctuation, and spelling, as well as words and phrases that may be inappropriate. If there are any questions or concerns please feel free to contact the dictating provider for clarification.      MD Cruz Scott MD  01/07/23 8860

## 2023-01-08 NOTE — ED TRIAGE NOTES
Headache started approx 48 hrs ago intermittently , hand started to feel numb today for 15 minutes and then went away, when arrived here was gone.  Alert and oriented x4

## 2023-01-08 NOTE — ED NOTES
Patient states that she feels much better after medications.  Headache 1/10     Salome Deal RN  01/08/23 6999

## 2023-01-08 NOTE — DISCHARGE INSTRUCTIONS
You presented for an atypical headache. Your lab work and imaging are reassuring here. Your headache improved after a standard headache cocktail. Recommend you stay hydrated, Tylenol and ibuprofen for any persistent or returning pains. If you develop any severe headache double vision new numbness or weakness or neck pain you need to return to emergency department. Otherwise schedule follow-up with neurology by calling the number above.

## 2023-01-08 NOTE — ED NOTES
Patient and  given d/c instructions with return verbalization. Emphasis on f/u with [facility] neurology. Pt to return with any worsening s/s as written on d/c instructions. Pt ready to go home, feels comfortable going home. Pt declined use of WC. Ambulated to lobby with steady gait.      Sherryle Croak, RN  01/08/23 1940

## 2023-01-09 ENCOUNTER — PATIENT MESSAGE (OUTPATIENT)
Dept: INTERNAL MEDICINE CLINIC | Age: 28
End: 2023-01-09

## 2023-01-09 NOTE — TELEPHONE ENCOUNTER
Call from pt this morning reporting two episodes of sharp stabbing head pain on the right radiating to the right eyes and then right posterior neck. Symptoms first noted yesterday and lasted several minutes. She woke this AM with similar symptoms in addition to left arm numbness and tingling. Advised pt go to the ER. She agreed.

## 2023-01-09 NOTE — TELEPHONE ENCOUNTER
From: Ileen Hodgkin  To: Dr. Stout Jefferson: 1/9/2023 3:37 PM EST  Subject: Follow-up ER Visit    Hi Dr. Veronique Ruiz,     I was in the hospital Saturday night due to 48hrs of awful headache pains on the right side of my head and some numbness in my left hand. I was referred to the [facility] Neurologist to schedule an appointment to get a better understanding of what was going on. I am unable to get in until January 31st. I am worried about having to take Tylenol every time I get one of these awful headaches before my appointment. Would you have any ability to help me get in to be seen earlier? I would be happy to talk more about this if you need more information and if you need me to schedule an appointment with you, I would be happy to do so.      Thank you

## 2023-01-12 NOTE — PROGRESS NOTES
Kevin Kitchen (:  1995) is a 32 y.o. female, here for evaluation of the following chief complaint(s):    Follow-up and Headache (Headache yesterday which lasted until today . ( Pain level can get up to a 8 )      ASSESSMENT/PLAN:  1. Headache around the eyes  Suspect patient is having migraine headache. Suggested a trial of Imitrex with the next severe headache. Doubt temporal arteritis or cluster headache based on patient demographics. No sinusitis seen on head CT. She has not had a recurrence of the hand numbness. She does have follow-up with [facility] neurology. -     [place] DISCHARGE MEDS RECONCILED W/ CURRENT OUTPATIENT MED LIST  -     SUMAtriptan (IMITREX) 100 MG tablet; Take 1 tablet at first sign of headache. May repeat in 2 hr if headache recurs. Maximum dose- 2 tablets/24 hr., Disp-9 tablet, R-0Normal  2. Epistaxis  Suggested nasal saline and use of a humidifier. She may discontinue her nose ring.  -     Kiko Dejesus MD, Otolaryngology, [facility]  3. Hyperglycemia      No follow-ups on file. SUBJECTIVE/OBJECTIVE:  Headache  Patient is here to follow-up her recent emergency room visit on  for headache. Reviewed her CT imaging and labs. There had been concern for stroke due to her complaints of hand numbness but CT head and neck angiogram was normal.  Had headache again over past few days, on left side and on right side. She is not normally a headache person. They are not correlated with her menstrual cycle. The worst headache of felt like it traveled from front to back and caused pressure behind the right eye. She had a tapping pain. Her vision was cut in half and they were bright lights in the corner. She saw her eye doctor last in 2022. She denies new medications. She denies fever or myalgias. She was not nauseated. She had COVID in November. She has not had a recurrence of the vision problems.   She has been on birth control pills for a long time.  She has not been eating new foods. She sometimes gets nosebleeds and has an iron taste down her throat. Patient is a non-smoker. Review of Systems   Constitutional:  Negative for fever. HENT:  Negative for ear pain and facial swelling. Neurological:  Positive for headaches. Negative for dizziness, syncope, speech difficulty and weakness. Psychiatric/Behavioral:  Negative for confusion. Past Medical History:   Diagnosis Date    Crohn's disease of colon with complication ([facility] [address]     dxd 10/13, currently on stelara       Current Outpatient Medications   Medication Sig Dispense Refill    SUMAtriptan (IMITREX) 100 MG tablet Take 1 tablet at first sign of headache. May repeat in 2 hr if headache recurs. Maximum dose- 2 tablets/24 hr. 9 tablet 0    CRISTOBAL 0.25-35 MG-MCG per tablet Take 1 tablet by mouth daily 84 tablet 3    fluticasone (FLONASE) 50 MCG/ACT nasal spray SPRAY 2 SPRAYS BY NASAL ROUTE EVERY DAY 16 g 3    STELARA 90 MG/ML SOSY prefilled syringe        No current facility-administered medications for this visit. Physical Exam  Vitals and nursing note reviewed. Constitutional:       General: She is not in acute distress. Appearance: She is well-developed. HENT:      Head: Normocephalic and atraumatic. Right Ear: External ear normal.      Left Ear: External ear normal.      Nose: Nose normal.      Comments: Blood noted at distal left side of septum, pt has nose ring  Eyes:      General: No scleral icterus. Pupils: Pupils are equal, round, and reactive to light. Neck:      Thyroid: No thyromegaly. Cardiovascular:      Rate and Rhythm: Normal rate and regular rhythm. Heart sounds: No murmur heard. Pulmonary:      Effort: No respiratory distress. Breath sounds: Normal breath sounds. No wheezing or rales. Abdominal:      General: Bowel sounds are normal. There is no distension. Palpations: Abdomen is soft. Tenderness:  There is no abdominal tenderness. Musculoskeletal:         General: Normal range of motion. Cervical back: Neck supple. Right lower leg: No edema. Left lower leg: No edema. Lymphadenopathy:      Cervical: No cervical adenopathy. Skin:     General: Skin is warm and dry. Neurological:      General: No focal deficit present. Mental Status: She is alert and oriented to person, place, and time. Cranial Nerves: No cranial nerve deficit. Sensory: No sensory deficit. Motor: No weakness. Coordination: Coordination normal.      Gait: Gait normal.   Psychiatric:         Mood and Affect: Mood normal.         Behavior: Behavior normal.             This note was generated completely or in part utilizing Dragon dictation speech recognition software. Occasionally, words are mistranscribed and despite editing, the text may contain inaccuracies due to incorrect word recognition. If further clarification is needed please contact the office at [phone]          An electronic signature was used to authenticate this note.     --Mena Darby MD

## 2023-01-13 ENCOUNTER — OFFICE VISIT (OUTPATIENT)
Dept: INTERNAL MEDICINE CLINIC | Age: 28
End: 2023-01-13
Payer: COMMERCIAL

## 2023-01-13 VITALS
BODY MASS INDEX: 24.72 KG/M2 | HEART RATE: 82 BPM | WEIGHT: 144 LBS | OXYGEN SATURATION: 98 % | DIASTOLIC BLOOD PRESSURE: 72 MMHG | SYSTOLIC BLOOD PRESSURE: 116 MMHG

## 2023-01-13 DIAGNOSIS — R73.9 HYPERGLYCEMIA: ICD-10-CM

## 2023-01-13 DIAGNOSIS — R51.9 HEADACHE AROUND THE EYES: Primary | ICD-10-CM

## 2023-01-13 DIAGNOSIS — R51.9 HEADACHE AROUND THE EYES: ICD-10-CM

## 2023-01-13 DIAGNOSIS — R04.0 EPISTAXIS: ICD-10-CM

## 2023-01-13 PROCEDURE — G8427 DOCREV CUR MEDS BY ELIG CLIN: HCPCS | Performed by: INTERNAL MEDICINE

## 2023-01-13 PROCEDURE — 1036F TOBACCO NON-USER: CPT | Performed by: INTERNAL MEDICINE

## 2023-01-13 PROCEDURE — 1111F DSCHRG MED/CURRENT MED MERGE: CPT | Performed by: INTERNAL MEDICINE

## 2023-01-13 PROCEDURE — G8484 FLU IMMUNIZE NO ADMIN: HCPCS | Performed by: INTERNAL MEDICINE

## 2023-01-13 PROCEDURE — G8420 CALC BMI NORM PARAMETERS: HCPCS | Performed by: INTERNAL MEDICINE

## 2023-01-13 PROCEDURE — 99214 OFFICE O/P EST MOD 30 MIN: CPT | Performed by: INTERNAL MEDICINE

## 2023-01-13 RX ORDER — SUMATRIPTAN 100 MG/1
TABLET, FILM COATED ORAL
Qty: 9 TABLET | Refills: 0 | Status: SHIPPED | OUTPATIENT
Start: 2023-01-13

## 2023-01-13 ASSESSMENT — PATIENT HEALTH QUESTIONNAIRE - PHQ9
1. LITTLE INTEREST OR PLEASURE IN DOING THINGS: 0
SUM OF ALL RESPONSES TO PHQ QUESTIONS 1-9: 0
SUM OF ALL RESPONSES TO PHQ9 QUESTIONS 1 & 2: 0
SUM OF ALL RESPONSES TO PHQ QUESTIONS 1-9: 0
2. FEELING DOWN, DEPRESSED OR HOPELESS: 0
SUM OF ALL RESPONSES TO PHQ QUESTIONS 1-9: 0
SUM OF ALL RESPONSES TO PHQ QUESTIONS 1-9: 0

## 2023-01-13 NOTE — PATIENT INSTRUCTIONS
Imitrex    See [facility] neurology    Terminous nasal saline  Humidifier  Vaseline  Consider removal of nose ring    Ent if persists

## 2023-01-15 ASSESSMENT — ENCOUNTER SYMPTOMS: FACIAL SWELLING: 0

## 2023-01-16 LAB
C-REACTIVE PROTEIN: 5.6 MG/L (ref 0–5.1)
SEDIMENTATION RATE, ERYTHROCYTE: 11 MM/HR (ref 0–20)

## 2023-01-17 LAB
ESTIMATED AVERAGE GLUCOSE: 93.9 MG/DL
HBA1C MFR BLD: 4.9 %

## 2023-01-23 ENCOUNTER — OFFICE VISIT (OUTPATIENT)
Dept: ENT CLINIC | Age: 28
End: 2023-01-23
Payer: COMMERCIAL

## 2023-01-23 VITALS
SYSTOLIC BLOOD PRESSURE: 136 MMHG | WEIGHT: 142 LBS | HEART RATE: 69 BPM | BODY MASS INDEX: 24.24 KG/M2 | DIASTOLIC BLOOD PRESSURE: 84 MMHG | HEIGHT: 64 IN

## 2023-01-23 DIAGNOSIS — R04.0 EPISTAXIS: ICD-10-CM

## 2023-01-23 DIAGNOSIS — J31.0 CHRONIC RHINITIS: Primary | ICD-10-CM

## 2023-01-23 PROCEDURE — G8427 DOCREV CUR MEDS BY ELIG CLIN: HCPCS | Performed by: OTOLARYNGOLOGY

## 2023-01-23 PROCEDURE — 99202 OFFICE O/P NEW SF 15 MIN: CPT | Performed by: OTOLARYNGOLOGY

## 2023-01-23 PROCEDURE — G8420 CALC BMI NORM PARAMETERS: HCPCS | Performed by: OTOLARYNGOLOGY

## 2023-01-23 PROCEDURE — 1036F TOBACCO NON-USER: CPT | Performed by: OTOLARYNGOLOGY

## 2023-01-23 PROCEDURE — G8484 FLU IMMUNIZE NO ADMIN: HCPCS | Performed by: OTOLARYNGOLOGY

## 2023-01-23 PROCEDURE — 31231 NASAL ENDOSCOPY DX: CPT | Performed by: OTOLARYNGOLOGY

## 2023-01-23 ASSESSMENT — ENCOUNTER SYMPTOMS
COLOR CHANGE: 0
WHEEZING: 0
CHEST TIGHTNESS: 0
VOICE CHANGE: 0
SINUS PRESSURE: 0
BACK PAIN: 0
EYE PAIN: 0
VOMITING: 0
COUGH: 0
SORE THROAT: 0
SINUS PAIN: 0
BLOOD IN STOOL: 0
CHOKING: 0
DIARRHEA: 0
APNEA: 0
CONSTIPATION: 0
SHORTNESS OF BREATH: 0
EYE DISCHARGE: 0
RHINORRHEA: 0
NAUSEA: 0
STRIDOR: 0
FACIAL SWELLING: 0
TROUBLE SWALLOWING: 0

## 2023-01-23 NOTE — LETTER
[facility]  [address]  Phone: [phone]  Fax: [phone] Mary Florian MD    January 23, 2023     Abena Cardoso, [address] [place] [address]    Patient: Louise Shelton   MR Number: [de-identified]   YOB: 1995   Date of Visit: 1/23/2023       Dear Abena Cardoso:    Thank you for referring Louise Shelton to me for evaluation/treatment. Below are the relevant portions of my assessment and plan of care. Diagnosis Orders   1. Chronic rhinitis        2. Epistaxis              Dr. Haley Alnaiz    Please follow the instructions below for treatment management of your nasal crusting. .     1. Be a two nostril blower. 2. Do not pick nose. 3. Do not place anything in nose. 4. Pretz nasal spray 5 times a day. Order from [address] [facility] [address] com  5. Saline nasal gel 3 times a day. 6. Bedside humidifier while sleeping. 7. Saline rinses twice a day. 8. If you smoke stop. Hold on flonase till spring. If you have questions, please do not hesitate to call me. I look forward to following [place] along with you.     Sincerely,      Rachell Strange MD

## 2023-01-23 NOTE — PATIENT INSTRUCTIONS
Dr. Sawyer Carver    Please follow the instructions below for treatment management of your nasal crusting. .     1. Be a two nostril blower. 2. Do not pick nose. 3. Do not place anything in nose. 4. Pretz nasal spray 5 times a day. Order from [address] [facility] [address]. com  5. Saline nasal gel 3 times a day. 6. Bedside humidifier while sleeping. 7. Saline rinses twice a day. 8. If you smoke stop.

## 2023-01-23 NOTE — PROGRESS NOTES
Subjective:      Patient ID: Ravin Gibson is a 32 y.o. female. HPI  Chief Complaint   Patient presents in consultation from Dr. Lizzeth marr  History of Present Illness:Nicki is a(n) 32 y.o. female who presents with blood when blowing nose. Since December. Was using flonase. Stopped 2 weeks ago. NO trauma. Nasal Discharge: none  Nasal Obstruction: No   Post Nasal Drainage: No  Nasal Bleeding: Yes  Sense of Smell: normal  Allergy Symptoms:  Patient complains of a 1 month history of mild  blood from nose . She denies purulent nasal discharge and sputum, fevers, lymphadenopathy, and sore throat. These symptoms are perennial. Current triggers include: no known precipitant. She has tried nothing. Immunotherapy has never been tried. The patient has no history of asthma. .  The patient has no history of eczema. .  The patient does not suffer from frequent sinopulmonary infections. .  She has not had sinus surgery in the past. Symptoms show no change over time. Current allergist:  No.  History of Facial/Head Trauma: No  Pain/Discomfort:No  Aspirin Sensitivity: No  Eye Symptoms: none  Previous Treatments: none         There is no problem list on file for this patient.     Past Surgical History:   Procedure Laterality Date    COLONOSCOPY  2013    COLONOSCOPY  10/2021    fu 5 yrs    TONSILLECTOMY      WISDOM TOOTH EXTRACTION       Family History   Problem Relation Age of Onset    Rectal Cancer Mother         dx'd late 45s    Pancreatic Cancer Maternal Grandmother     Lung Cancer Paternal Grandfather     Diabetes type 2  Paternal Grandfather        Social History     Socioeconomic History    Marital status:      Spouse name: Not on file    Number of children: 0    Years of education: Not on file    Highest education level: Not on file   Occupational History    Occupation: working in [employer]   Tobacco Use    Smoking status: Never    Smokeless tobacco: Never   Vaping Use    Vaping Use: Never used Substance and Sexual Activity    Alcohol use: Not Currently    Drug use: Never    Sexual activity: Yes     Partners: Male   Other Topics Concern    Not on file   Social History Narrative    [facility]  [facility] Strain: Low Risk     Difficulty of Paying Living Expenses: Not hard at all   Food Insecurity: No Food Insecurity    Worried About Running Out of Food in the Last Year: Never true    Ran Out of Food in the Last Year: Never true   Transportation Needs: Not on file   Physical Activity: Not on file   Stress: Not on file   Social Connections: Not on file   Intimate Partner Violence: Not on file   Housing Stability: Not on file       DRUG/FOOD ALLERGIES: Patient has no known allergies. CURRENT MEDICATIONS  Prior to Admission medications    Medication Sig Start Date End Date Taking? Authorizing Provider   SUMAtriptan (IMITREX) 100 MG tablet Take 1 tablet at first sign of headache. May repeat in 2 hr if headache recurs.   Maximum dose- 2 tablets/24 hr.  Patient not taking: Reported on 1/23/2023 1/13/23   Teja Abebe MD   CRISTOBAL 0.25-35 MG-MCG per tablet Take 1 tablet by mouth daily  Patient not taking: Reported on 1/23/2023 12/15/22   Alisson Jacob MD   fluticasone [facility]) 50 MCG/ACT nasal spray SPRAY 2 SPRAYS BY NASAL ROUTE EVERY DAY  Patient not taking: Reported on 1/23/2023 11/16/22   Teja Abebe MD   [facility] 90 MG/ML SOSY prefilled syringe  7/7/21   Historical Provider, MD       Lab Studies:  Lab Results   Component Value Date    WBC 8.4 01/07/2023    HGB 14.2 01/07/2023    HCT 42.4 01/07/2023    MCV 86.8 01/07/2023     01/07/2023     Lab Results   Component Value Date    GLUCOSE 113 (H) 01/07/2023    BUN 15 01/07/2023    CREATININE 0.9 01/07/2023    K 4.5 01/07/2023     01/07/2023     01/07/2023    CALCIUM 9.8 01/07/2023     Lab Results   Component Value Date    MG 1.80 04/07/2022     No results found for: PHOS  Lab Results   Component Value Date    ALKPHOS 66 01/07/2023    ALT 8 (L) 01/07/2023    AST 11 (L) 01/07/2023    BILITOT <0.2 01/07/2023    PROT 7.1 01/07/2023      Review of Systems   Constitutional:  Negative for activity change, appetite change, chills, fatigue and fever. HENT:  Positive for nosebleeds. Negative for congestion, dental problem, drooling, ear discharge, ear pain, facial swelling, hearing loss, mouth sores, postnasal drip, rhinorrhea, sinus pressure, sinus pain, sneezing, sore throat, tinnitus, trouble swallowing and voice change. Eyes:  Negative for pain, discharge and visual disturbance. Respiratory:  Negative for apnea, cough, choking, chest tightness, shortness of breath, wheezing and stridor. Cardiovascular:  Negative for palpitations. Gastrointestinal:  Negative for blood in stool, constipation, diarrhea, nausea and vomiting. Endocrine: Negative for cold intolerance, heat intolerance, polydipsia, polyphagia and polyuria. Musculoskeletal:  Negative for back pain, gait problem, neck pain and neck stiffness. Skin:  Negative for color change, pallor, rash and wound. Allergic/Immunologic: Negative for environmental allergies, food allergies and immunocompromised state. Neurological:  Negative for dizziness, facial asymmetry, speech difficulty, light-headedness, numbness and headaches. Hematological:  Negative for adenopathy. Does not bruise/bleed easily. Psychiatric/Behavioral:  Negative for agitation, confusion, self-injury and sleep disturbance. The patient is not nervous/anxious. Objective:   Physical Exam  Constitutional:       Appearance: She is well-developed. She is not ill-appearing. HENT:      Head: Normocephalic and atraumatic. Not macrocephalic and not microcephalic. No raccoon eyes, Newell's sign, abrasion, contusion, right periorbital erythema, left periorbital erythema or laceration. Hair is normal.      Jaw: No trismus.       Salivary Glands: Right salivary gland is not diffusely enlarged. Left salivary gland is not diffusely enlarged. Right Ear: Hearing, tympanic membrane and external ear normal. No decreased hearing noted. No drainage, swelling or tenderness. No middle ear effusion. No mastoid tenderness. Tympanic membrane is not perforated, retracted or bulging. Tympanic membrane has normal mobility. Left Ear: Hearing, tympanic membrane and external ear normal. No decreased hearing noted. No drainage, swelling or tenderness. No middle ear effusion. No mastoid tenderness. Tympanic membrane is not perforated, retracted or bulging. Tympanic membrane has normal mobility. Ears:      Bertrand exam findings: Does not lateralize. Right Rinne: AC > BC. Left Rinne: AC > BC. Nose: No nasal deformity, septal deviation, laceration, mucosal edema or rhinorrhea. Right Nostril: No epistaxis or occlusion. Left Nostril: No epistaxis or occlusion. Right Turbinates: Not enlarged, swollen or pale. Left Turbinates: Not enlarged, swollen or pale. Right Sinus: No maxillary sinus tenderness or frontal sinus tenderness. Left Sinus: No maxillary sinus tenderness or frontal sinus tenderness. Mouth/Throat:      Lips: No lesions. Mouth: Mucous membranes are moist. Mucous membranes are not pale, not dry and not cyanotic. No lacerations or oral lesions. Dentition: Normal dentition. No dental caries or dental abscesses. Tongue: No lesions. Palate: No mass. Pharynx: Uvula midline. No oropharyngeal exudate, posterior oropharyngeal erythema or uvula swelling. Tonsils: No tonsillar abscesses. Eyes:      General: Lids are normal. Lids are everted, no foreign bodies appreciated. Right eye: No discharge. Left eye: No discharge. Extraocular Movements:      Right eye: Normal extraocular motion and no nystagmus. Left eye: Normal extraocular motion and no nystagmus. Conjunctiva/sclera:      Right eye: Right conjunctiva is not injected. No chemosis or exudate. Left eye: Left conjunctiva is not injected. No chemosis or exudate. Neck:      Thyroid: No thyroid mass or thyromegaly. Vascular: Normal carotid pulses. Trachea: Trachea normal. No tracheal deviation. Cardiovascular:      Rate and Rhythm: Normal rate and regular rhythm. Pulmonary:      Effort: No tachypnea, bradypnea or respiratory distress. Breath sounds: No stridor. Musculoskeletal:      Right shoulder: Normal range of motion. Left shoulder: Normal range of motion. Cervical back: Neck supple. Lymphadenopathy:      Head:      Right side of head: No submental, submandibular, tonsillar, preauricular, posterior auricular or occipital adenopathy. Left side of head: No submental, submandibular, tonsillar, preauricular, posterior auricular or occipital adenopathy. Cervical:      Right cervical: No superficial, deep or posterior cervical adenopathy. Left cervical: No superficial, deep or posterior cervical adenopathy. Skin:     Findings: No bruising, erythema, laceration or lesion. Neurological:      Mental Status: She is alert and oriented to person, place, and time. Psychiatric:         Speech: Speech normal.         Behavior: Behavior normal.         Due to the patients chronic sinus disease and/or history of sinonasal neoplasm for surveillance a nasal endoscopy with or without debridement will be performed to complete a significant physical examination of the patient which cannot be performed by anterior rhinoscopy alone (failure of complete examination of the paranasal sinuses). Failure to provide this procedure may lead to late detection of significant chronic benign disease, acute exacerbation, resolution or failure of early diagnosis of recurrent cancer. The procedure report is present in the body of the chart.        Nasal Endoscopy    Pre OP: epsitaxis  Post OP: Same  Reason: No localization on anterior rhinoscopy  Procedure: Nasal endoscopy  Surgeon: Garrett Duran  Anesthesia: Afrin with 2% lidocaine  Estimated Blood Loss: None      After obtaining verbal consent from the patient 1% lidocaine with afrin was sprayed into the nasal cavities. After allowing a time for anesthesia, a nasal endoscope was placed into the nostril. The septum, inferior, and middle turbinates were examined. The middle meatus, and sphenoethmoid recess was examined bilaterally. Cultures were not obtained from the sinuses. There were no complications. Pertinent positives included: There was not edema and purulence in the left middle meatus. There was not edema and purulence at the right middle meatus. Polyps were not identified in the sinuses. Masses were not identified. No obvious source of blood. Some generalize non active Blood. Tolerated well without complication. I attest that I was present for and did the entire procedure myself. Assessment:       Diagnosis Orders   1. Chronic rhinitis        2. Epistaxis                Plan:      Dr. Che Saez    Please follow the instructions below for treatment management of your nasal crusting. .     1. Be a two nostril blower. 2. Do not pick nose. 3. Do not place anything in nose. 4. Pretz nasal spray 5 times a day. Order from "[employer]  5. Saline nasal gel 3 times a day. 6. Bedside humidifier while sleeping. 7. Saline rinses twice a day. 8. If you smoke stop. Hold on flonase till spring.             Miller Bamberger, MD

## 2023-02-14 ENCOUNTER — OFFICE VISIT (OUTPATIENT)
Dept: PSYCHOLOGY | Age: 28
End: 2023-02-14

## 2023-02-14 DIAGNOSIS — F41.9 ANXIETY: Primary | ICD-10-CM

## 2023-02-14 DIAGNOSIS — F41.0 PANIC ATTACKS: ICD-10-CM

## 2023-02-14 ASSESSMENT — PATIENT HEALTH QUESTIONNAIRE - PHQ9
5. POOR APPETITE OR OVEREATING: 0
4. FEELING TIRED OR HAVING LITTLE ENERGY: 0
7. TROUBLE CONCENTRATING ON THINGS, SUCH AS READING THE NEWSPAPER OR WATCHING TELEVISION: 0
SUM OF ALL RESPONSES TO PHQ QUESTIONS 1-9: 2
3. TROUBLE FALLING OR STAYING ASLEEP: 1
10. IF YOU CHECKED OFF ANY PROBLEMS, HOW DIFFICULT HAVE THESE PROBLEMS MADE IT FOR YOU TO DO YOUR WORK, TAKE CARE OF THINGS AT HOME, OR GET ALONG WITH OTHER PEOPLE: 0
6. FEELING BAD ABOUT YOURSELF - OR THAT YOU ARE A FAILURE OR HAVE LET YOURSELF OR YOUR FAMILY DOWN: 1
SUM OF ALL RESPONSES TO PHQ QUESTIONS 1-9: 3
SUM OF ALL RESPONSES TO PHQ QUESTIONS 1-9: 3
SUM OF ALL RESPONSES TO PHQ9 QUESTIONS 1 & 2: 0
8. MOVING OR SPEAKING SO SLOWLY THAT OTHER PEOPLE COULD HAVE NOTICED. OR THE OPPOSITE, BEING SO FIGETY OR RESTLESS THAT YOU HAVE BEEN MOVING AROUND A LOT MORE THAN USUAL: 0
1. LITTLE INTEREST OR PLEASURE IN DOING THINGS: 0
2. FEELING DOWN, DEPRESSED OR HOPELESS: 0
SUM OF ALL RESPONSES TO PHQ QUESTIONS 1-9: 3
9. THOUGHTS THAT YOU WOULD BE BETTER OFF DEAD, OR OF HURTING YOURSELF: 1

## 2023-02-14 ASSESSMENT — ANXIETY QUESTIONNAIRES
4. TROUBLE RELAXING: 0
3. WORRYING TOO MUCH ABOUT DIFFERENT THINGS: 0
2. NOT BEING ABLE TO STOP OR CONTROL WORRYING: 3
6. BECOMING EASILY ANNOYED OR IRRITABLE: 1
5. BEING SO RESTLESS THAT IT IS HARD TO SIT STILL: 0
IF YOU CHECKED OFF ANY PROBLEMS ON THIS QUESTIONNAIRE, HOW DIFFICULT HAVE THESE PROBLEMS MADE IT FOR YOU TO DO YOUR WORK, TAKE CARE OF THINGS AT HOME, OR GET ALONG WITH OTHER PEOPLE: NOT DIFFICULT AT ALL
7. FEELING AFRAID AS IF SOMETHING AWFUL MIGHT HAPPEN: 0
GAD7 TOTAL SCORE: 5
1. FEELING NERVOUS, ANXIOUS, OR ON EDGE: 1

## 2023-02-14 NOTE — PATIENT INSTRUCTIONS
Return to see Dr. Daniel Olsen in 2 weeks. Begin scheduling worry time to aid in worry management  Practice box breathing daily  Engage in progressive muscle relaxation exercises  Review handout on anxiety  Call [facility] office if you begin experiencing suicidal thoughts; if an emergency, go to nearest ER, call crisis hotline, or call 911      The Physiology of Anxiety    When you sense danger, your brain activates your autonomic nervous system. The two branches of your autonomic nervous system, the sympathetic and the parasympathetic, control your body's energy level in order to prepare you for action. The sympathetic nervous system controls your fight or flight response and releases energy to prepare you for action. The parasympathetic nervous system is your bodys relaxation/recovery system:  it returns your body to a normal state when the danger is over. The sympathetic nervous system is an all-or-none system. That means that when its activated it quickly turns on all of its component parts (which is a great way for an emergency response system to operate):    Rapid Heart Rate, Rapid Breathing:  The alarm reaction increases the heart rate and breathing rate so that we are alert and our muscles are ready for action. These changes also help insure that the muscles and brain will have enough oxygen and energy for defense. At the same time, blood flow to the skin decreases, which prevents us from losing as much blood if we are wounded. Sweating:  Sweating helps to cool the body during exertion, making it more efficient. Nora Lopes is what some people feel when sweating occurs at the same time that blood flow to the skin decreases. Tight Chest, Tingling, Numbness, Hot Flushes, Trembling:  Hyperventilation occurs when we breathe rapidly but do not expend the energy with muscle action, like revving a car while holding down the brake.   This can lead to feelings of tingling and numbness, hot flushes, and increased sweating. When rapid chest breathing and muscle tension occur at the same time, people feel chest pain, breathlessness, and choking. Upset Stomach, Diarrhea:  Digestion isnt needed during times of danger, and the sympathetic nervous system shuts it down, leading to dry mouth and an upset stomach. Since excess weight isnt needed in times of acute danger, the body may eliminate the lower digestive track, which causes diarrhea. Blurred Vision, Derealization, Depersonalization: It is common for our pupils to dilate during times of danger. Although this improves night vision by increasing the amount of light that can enter the eye, it can also lead to blurred or brighter vision during the day. These changes in visual perception, when combined with the other unusual physical sensations mentioned above, can contribute to feelings of unreality, such as derealization and depersonalization. When this alarm system activates the brain automatically takes in, like a flashbulb camera, everything that can be taken in by the five senses (sights, sounds, smells, tastes, and touch). The goal is to keep us alive in the future. If I can remember everything about this situation then I can prevent myself from ever being in a similar situation. While the goal is survival, a natural byproduct is that the brain takes in EVERYTHING which means that certain things about our environment that were once neutral (i.e., the smell of car exhaust, a crowded restaurant, etc.) now have a negative association. For example, in a classic scientific experiment, scientists were measuring the salivation of dogs to the presentation of dog food. In the process of measuring the salivation of dogs they found, unexpectedly that dogs began to salivate not only when food was present, but at the site of researchers in white coats and the ring of bell caused by the opening of a door.   It was concluded that the dogs began to associate these previously neutral stimuli with getting food and, as a result, they would salivate if these were present even without the presence of food. WHY WONT IT GO AWAY? Avoidance is the key. If you stay away from those things that illicit the fear response your brain gets to maintain that it is dangerous and if you leave a situation because it illicits the fear response your brain convinces you that without leaving you would have never survived. If we never got back on the bike after we fell for the first time that would be our last memory of riding bikes and none of us would be riding bikes today. WHAT DO I DO NOW? The answer is simple, but the act is difficult. We have to go towards those things our brain tells us we must avoid. The goal of in-vivo exposure is to relearn through gradual exposure the following: These uncomfortable feelings will decrease with time. I do not have to leave or avoid them all together as they will decrease. There is nothing to be afraid of. You will see that the things you have been avoiding are not inherently dangerous, but you cannot learn that without exposure. I can do it. No longer do your actions have to be dictated by anxiety or fear. Personal Thought  Control. Our thinking often creates anxiety for us. Getting better control of our thinking can go a long way in helping us cope. The following steps can be useful. Let yourself become aware of thoughts you have when you are anxious. What are the words that you are saying to yourself at that moment? Sometimes it takes a little practice before we become aware of our thoughts. Some examples might be:  I know something bad is going to happen, or This is horrible or Timothy Jim is this happening to me!?  Write your thoughts down. Its much easier to work with our thoughts, analyze them, and replace them if they are in black and white.   Ask yourself the following questions about your thoughts:  Is it true? (Is it logically correct? Where is the evidence to support the truth of that thought? Are there alternative ways of thinking that would be more correct?). If a thought is not as true as it could be, replace it with a more realistic and helpful one. The majority of thoughts we have that generate anxiety are not the most realistic appraisals of the situation. So what? (If this is logically correct, what does it mean to me? Is there anything I can do about the situation? Is it in my best interest to get anxious about this?). Use coping self-statements. When feeling anxious, you may be able to tell yourself automatic phrases without thinking too much about it. A couple of examples would be phrases such as Its OK, I can handle it, or Ive been through things like this before and have done all right.   Notice that these statements tend to be true for all of us. Notice a change in your emotional state as you change your thinking. As your thoughts become more realistic, you will probably notice a decrease in anxiety and tension, and an increase in your ability to cope. The Stress Response and How It Can Affect You   The stress response, or fight or flight response is the emergency reaction system of the body. It is there to keep you safe in emergencies. The stress response includes physical and thought responses to your perception of various situations. When the stress response is turned on, your body may release substances like adrenaline and cortisol. Your organs are programmed to respond in certain ways to situations that are viewed as challenging or threatening. The stress response can work against you. You can turn it on when you dont really need it and, as a result, perceive something as an emergency when its really not. It can turn on when you are just thinking about past or future events.  Harmless, chronic conditions can be intensified by the stress response activating too often, with too much intensity, or for too long. Stress responses can be different for different individuals. Below is a list of some common stress related responses people have. (Mineral the responses you have had in the last 2 weeks.)     Physical Responses   Muscle aches   Insomnia   ? Heart rate   Headache   Weight gain   Nausea   Constipation   Dry mouth   Muscle twitching  Weight loss   Low energy   Weakness   Tight chest   Diarrhea   Dizziness   Trembling   Stomach cramps  Chills    Hot flashes   Sweating   Pounding heart  Choking feeling  Chest pain   Leg cramps   Numb hands/feet Dry throat   Appetite change  Face flushing   ? Blood pressure  Light-headedness  Feeling faint       Troubleswallowing   Rash ? Urination   Neck pain     Tingling hands/feet     Emotional and Thought Responses   Restlessness   Agitation   Insecurity            Worthlessness   Anxiety   Stress    Depression            Hopelessness   Guilt    Defensiveness  Anger           Racing thoughts   Nightmares   Intense thinking  Sensitivity          Expecting the worst   Numbness   Lack of motivation  Mood swings             Forgetfulness   ? Concentration  Rigidity              Preoccupation  Intolerance     Behavioral Responses   Avoidance   Withdrawal   Neglect   ? Alcohol use    Smoking   ? Eating   Arguing       Poor appearance   ? Spending   Poor hygiene   ? Eating  Seeking reassurance   Nail biting   Skin picking   ? Talking        ? Body checking   Sexual problems  Foot tapping  Fidgeting Rapid walking    ? Exercise   Teeth clenching           Multitasking  Aggressive speaking       ? Fun activities  ? Sleeping      ? Relaxing activities     Seeking information     The parasympathetic nervous system in your body is designed to turn on your bodys relaxation response.  Your behaviors and thinking can keep your bodys natural relaxation response from operating at its best.   Getting your body to relax on a daily basis for at least brief periods can help decrease unpleasant stress responses. Learning to relax your body, through specific breathing and relaxation exercises as well as by minimizing stressful thinking, can help your bodys natural relaxation system be more effective. How to do Box Breathing  Step 1: Breathe in counting to four slowly. Feel the air enter your lungs. Step 2: Hold your breath for 4 seconds. Try to avoid inhaling or exhaling for 4 seconds. Step 3: Slowly exhale through your mouth for 4 seconds. Step 4: Repeat steps 1 to 3 until you feel re-centered. Progressive Muscle Relaxation  Progressive Muscle Relaxation is a relaxation technique used to release stress. It can relax the muscles and lower blood pressure, heart rate, and respiration. Progressive Muscle Relaxation is the tensing and then relaxing each muscle group of the body, one group at a time. Though this technique is simple it may take several sessions before it is 'mastered.'   Some people prefer to listen to an audiotape (or CD or mp3) that guides one through progressive muscle relaxation. The scripts are usually about 20 minutes long. Progressive muscle relaxation may be done sitting or lying down. Tense up a group of muscles - tense hard but don't strain - and hold for about 5-10 seconds. Release the tension from the muscles all at once. Stay relaxed for 10 - 20 seconds. Some people prefer to count, for example:  Tense for count of 5  Release all at once  Rest for count of 10  . ..or  Tense for count of 10  Release all at once  Rest for count of 20  Pay close attention to the feeling of relaxation when you release the contracted muscles. When going through the muscle groups, some people start with the hands, others with the feet. You may do one side of the body (hand, arm, leg, foot) at a time or do both sides at the same time.  Listening to a prerecorded script that guides you through the process is helpful. Sample of Progressive Muscle Relaxation Exercise:   Hands - Clench fists  tense for 5, release, rest for 10      Right forearms and hands - Extend arm, elbow locked, and bend hand back at the wrist  tense for 5, release, rest for 10      Upper right arm - Bend arms at elbows and flex biceps  tense for 5, release, rest for 10     Forehead - wrinkle forehead into frown, tense, release, rest, and/or raise eyebrows  tense for 5, release, rest for 10      Eyes - close eyes tightly, hold and release  tense for 5, release, rest for 10      Mouth - press lips tightly together  tense for 5, release, rest for 10      Jaw - open mouth wide and stick out tongue  tense for 5, release, rest for 10      Buttocks - tense  tense for 5, release, rest for 10      Abdomen  tense for 5, release, rest for 10      Chest  tense for 5, release, rest for 10      Back - arch back  tense for 5, release, rest for 10      Neck and shoulders  tense for 5, release, rest for 10      Thighs  tense for 5, release, rest for 10      Lower legs and feet - Point toes toward shin  tense for 5, release, rest for 10      Feet - Point toes and curl them under  tense for 5, release, rest for 10     You may repeat relaxing and tensing muscle groups that have you have already done to relax them further. Worry Management    The following strategies may be used to deal with your worries when you feel that they are becoming overwhelming. 1. Worry Place and Time. Set a 30-minute worry period that will take place at the same time and same place each day. Your worry place and time will be: ______________________________________________________________________  2. Delay Worry. If you notice you are worrying outside of your scheduled worry time, tell yourself, I have plenty of time to focus on this later.  Right now Im just going to be in the moment and notice what Im doing, what others are doing, the environment, and other things I see, hear, or smell.   3. Worry Log. Record all your worries during your worry time on the Worry Log on the following page and then take time to categorize these worries. You can choose categories that are helpful for you. You might organize them by Big Concerns, Medium Concerns, and Small Concerns.  Another option would be to categorize them by content area, such as Work Concerns, Family Concerns, Financial Concerns, and Relationship Concerns.  Any means of categorizing can be used; however, it is important not to use too many categories; usually between three and seven work best.    In the next column of your worry log, you can write how you will manage the problem. If the problem is something you have absolutely no control over, you might write down, Im not going to worry about this problem because there is nothing I can do about it right now.     Worry Log  Worrisome thought Category Management strategy

## 2023-02-14 NOTE — PROGRESS NOTES
Behavioral Health Consultation  PAT BECKHAM Psy.D. Psychologist  2/14/2023  8:02 AM EST      Time spent with Patient: 30 minutes  This is patient's first [facility] appointment. Reason for Consult: anxiety  Referring Provider: Nazanin Canela MD  [id number] [phone]    Pt provided informed consent for the behavioral health program. Discussed with patient model of service to include the limits of confidentiality (i.e. abuse reporting, suicide intervention, etc.) and short-term intervention focused approach. Pt indicated understanding. Feedback given to PCP. S:  Patient presents with concerns about anxiety and panic attacks. Reported having first panic attack last year and has been having them the last couple of weeks. Discussed 20-30 minute periods of increased HR, feet getting hot or going numb, not feeling like things are in focus, fear of dying, sweating, chills or hot flushes. Noted she has always been a worrier and \"overthinker. \" Worries about family, , self. Feels she has been anxious since childhood. Pt reported she is looking into getting a new job and moving. Waiting on genetic testing results which has been a recent stressor. Pt finds relief from drinking water, taking deep breaths. Goals for treatment include building coping skills for anxiety. Patient lives with . Patient works as a  for overnight [employer]. Caffeine use includes latte/AM occasionally. Denied cigarette use, denied alcohol use, denied illegal drug use. There is regular exercise, pt works out daily. Patient describes apprx 6 hours sleep/night. Sometimes feels rested. Difficulties staying asleep. Patient enjoys the following hobbies: singing, taking [employer] class. Patient describes good social support from . Patient identifies as Hinduism. Denied known familial MH history.      Mental Health History  Psychotropic medications: none  Psychiatric hospitalizations: denied  Suicidal ideation/homicidal ideation: denied; reported thoughts of being a burden but denied active suicidal ideation, plan, intent  Suicide attempts: denied  Previous outpatient treatment: denied    O:  MSE:    Attitude: cooperative and friendly  Consciousness: alert  Orientation: oriented to person, place, time, general circumstance  Memory: recent and remote memory intact  Attention/Concentration: intact during session  Speech: normal rate and volume, well-articulated  Mood: \"good\"   Affect: congruent  Perception: within normal limits  Thought Content: within normal limits  Thought Process: logical, coherent and goal-directed  Insight: good  Judgment: intact  Ability to understand instructions: Yes  Ability to respond meaningfully: Yes  Morbid Ideation: no   Suicide Assessment: no suicidal ideation, plan, or intent  Homicidal Ideation: no    History:    Medications:   Current Outpatient Medications   Medication Sig Dispense Refill    SUMAtriptan (IMITREX) 100 MG tablet Take 1 tablet at first sign of headache. May repeat in 2 hr if headache recurs. Maximum dose- 2 tablets/24 hr. (Patient not taking: Reported on 1/23/2023) 9 tablet 0    CRISTOBAL 0.25-35 MG-MCG per tablet Take 1 tablet by mouth daily (Patient not taking: Reported on 1/23/2023) 84 tablet 3    fluticasone (FLONASE) 50 MCG/ACT nasal spray SPRAY 2 SPRAYS BY NASAL ROUTE EVERY DAY (Patient not taking: Reported on 1/23/2023) 16 g 3    STELARA 90 MG/ML SOSY prefilled syringe  (Patient not taking: Reported on 1/23/2023)       No current facility-administered medications for this visit.      Social History:   Social History     Socioeconomic History    Marital status:      Spouse name: Not on file    Number of children: 0    Years of education: Not on file    Highest education level: Not on file   Occupational History    Occupation: working in [employer]   Tobacco Use    Smoking status: Never    Smokeless tobacco: Never   Vaping Use    Vaping Use: Never used   Substance and Sexual Activity    Alcohol use: Not Currently    Drug use: Never    Sexual activity: Yes     Partners: Male   Other Topics Concern    Not on file   Social History Narrative    Met  at [facility] Strain: Low Risk     Difficulty of Paying Living Expenses: Not hard at all   Food Insecurity: No Food Insecurity    Worried About Running Out of Food in the Last Year: Never true    Ran Out of Food in the Last Year: Never true   Transportation Needs: Not on file   Physical Activity: Not on file   Stress: Not on file   Social Connections: Not on file   Intimate Partner Violence: Not on file   Housing Stability: Not on file     TOBACCO:   reports that she has never smoked. She has never used smokeless tobacco.  ETOH:   reports that she does not currently use alcohol. Family History:   Family History   Problem Relation Age of Onset    Rectal Cancer Mother         dx'd late 45s    Pancreatic Cancer Maternal Grandmother     Lung Cancer Paternal Grandfather     Diabetes type 2  Paternal Grandfather        A:  Administered PHQ-9 and RE-7 (see below). Patient endorses Minimal symptoms of depression and Mild symptoms of anxiety. Denied suicidal ideation. Reported thoughts of being a burden. Insight and motivation are good. PHQ-9 Questionaire 2/14/2023 1/13/2023 4/7/2022 3/25/2021   Little interest or pleasure in doing things 0 0 0 0   Feeling down, depressed, or hopeless 0 0 0 0   Trouble falling or staying asleep, or sleeping too much 1 - - -   Feeling tired or having little energy 0 - - -   Poor appetite or overeating 0 - - -   Feeling bad about yourself - or that you are a failure or have let yourself or your family down 1 - - -   Trouble concentrating on things, such as reading the newspaper or watching television 0 - - -   Moving or speaking so slowly that other people could have noticed.  Or the opposite - being so fidgety or restless that you have been moving around a lot more than usual 0 - - -   Thoughts that you would be better off dead, or of hurting yourself in some way 1 - - -   PHQ-9 Total Score 3 0 0 0   If you checked off any problems, how difficult have these problems made it for you to do your work, take care of things at home, or get along with other people? 0 - - -     PHQ Scores 2/14/2023 1/13/2023 4/7/2022 3/25/2021   PHQ2 Score 0 0 0 0   PHQ9 Score 3 0 0 0       Interpretation of Total Score Depression Severity: 1-4 = Minimal depression, 5-9 = Mild depression, 10-14 = Moderate depression, 15-19 = Moderately severe depression, 20-27 = Severe depression     RE-7 SCREENING 2/14/2023   Feeling nervous, anxious, or on edge Several days   Not being able to stop or control worrying Nearly every day   Worrying too much about different things Not at all   Trouble relaxing Not at all   Being so restless that it is hard to sit still Not at all   Becoming easily annoyed or irritable Several days   Feeling afraid as if something awful might happen Not at all   RE-7 Total Score 5   How difficult have these problems made it for you to do your work, take care of things at home, or get along with other people? Not difficult at all     RE 7 SCORE 2/14/2023   RE-7 Total Score 5       Interpretation of Total Score. Anxiety Severity: Score 0-4: Minimal Anxiety. Score 5-9: Mild Anxiety. Score 10-14: Moderate Anxiety. Score greater than 15: Severe Anxiety. Diagnosis:  1. Anxiety    2.  Panic attacks        Past Medical History:      Diagnosis Date    Crohn's disease of colon with complication ([facility] [address]     dxd 10/13, currently on stelara       Plan:  Pt interventions:  Discussed self-care (sleep, nutrition, rewarding activities, social support, exercise), Established rapport, Conducted functional assessment, [place]-setting to identify pt's primary goals for [facility] visit / overall health, Supportive techniques, Emphasized self-care as important for managing overall health, Provided Psychoeducation re: stress response, and Emphasized importance of regular practice of relaxation strategies to target / promote anxiety management    Pt Behavioral Change Plan:   See Pt Instructions.

## 2023-02-28 ENCOUNTER — OFFICE VISIT (OUTPATIENT)
Dept: PSYCHOLOGY | Age: 28
End: 2023-02-28
Payer: COMMERCIAL

## 2023-02-28 DIAGNOSIS — F41.9 ANXIETY: Primary | ICD-10-CM

## 2023-02-28 DIAGNOSIS — F41.0 PANIC ATTACKS: ICD-10-CM

## 2023-02-28 PROCEDURE — 90832 PSYTX W PT 30 MINUTES: CPT

## 2023-02-28 PROCEDURE — 1036F TOBACCO NON-USER: CPT

## 2023-02-28 ASSESSMENT — PATIENT HEALTH QUESTIONNAIRE - PHQ9
SUM OF ALL RESPONSES TO PHQ QUESTIONS 1-9: 1
2. FEELING DOWN, DEPRESSED OR HOPELESS: 0
SUM OF ALL RESPONSES TO PHQ QUESTIONS 1-9: 1
6. FEELING BAD ABOUT YOURSELF - OR THAT YOU ARE A FAILURE OR HAVE LET YOURSELF OR YOUR FAMILY DOWN: 0
3. TROUBLE FALLING OR STAYING ASLEEP: 1
1. LITTLE INTEREST OR PLEASURE IN DOING THINGS: 0
SUM OF ALL RESPONSES TO PHQ QUESTIONS 1-9: 1
10. IF YOU CHECKED OFF ANY PROBLEMS, HOW DIFFICULT HAVE THESE PROBLEMS MADE IT FOR YOU TO DO YOUR WORK, TAKE CARE OF THINGS AT HOME, OR GET ALONG WITH OTHER PEOPLE: 0
9. THOUGHTS THAT YOU WOULD BE BETTER OFF DEAD, OR OF HURTING YOURSELF: 0
SUM OF ALL RESPONSES TO PHQ QUESTIONS 1-9: 1
4. FEELING TIRED OR HAVING LITTLE ENERGY: 0
7. TROUBLE CONCENTRATING ON THINGS, SUCH AS READING THE NEWSPAPER OR WATCHING TELEVISION: 0
5. POOR APPETITE OR OVEREATING: 0
SUM OF ALL RESPONSES TO PHQ9 QUESTIONS 1 & 2: 0
8. MOVING OR SPEAKING SO SLOWLY THAT OTHER PEOPLE COULD HAVE NOTICED. OR THE OPPOSITE, BEING SO FIGETY OR RESTLESS THAT YOU HAVE BEEN MOVING AROUND A LOT MORE THAN USUAL: 0

## 2023-02-28 ASSESSMENT — ANXIETY QUESTIONNAIRES
IF YOU CHECKED OFF ANY PROBLEMS ON THIS QUESTIONNAIRE, HOW DIFFICULT HAVE THESE PROBLEMS MADE IT FOR YOU TO DO YOUR WORK, TAKE CARE OF THINGS AT HOME, OR GET ALONG WITH OTHER PEOPLE: NOT DIFFICULT AT ALL
GAD7 TOTAL SCORE: 1
3. WORRYING TOO MUCH ABOUT DIFFERENT THINGS: 0
5. BEING SO RESTLESS THAT IT IS HARD TO SIT STILL: 0
1. FEELING NERVOUS, ANXIOUS, OR ON EDGE: 1
2. NOT BEING ABLE TO STOP OR CONTROL WORRYING: 0
7. FEELING AFRAID AS IF SOMETHING AWFUL MIGHT HAPPEN: 0
6. BECOMING EASILY ANNOYED OR IRRITABLE: 0
4. TROUBLE RELAXING: 0

## 2023-02-28 NOTE — PATIENT INSTRUCTIONS
Return to see Dr. Breezy Cho in 2 weeks  Limit caffeine intake  Review sleep hygiene guidelines below  Begin to identify commonly used cognitive distortions below  Practice balanced thinking using cognitive strategies to untwist thinking  Continue practicing relaxation strategies      Sleep Hygiene Guidelines    Good dental hygiene is important in determining the health of your teeth and gums. We all know we are supposed to brush and floss regularly. Those who do so are more likely to have strong, healthy gums and less cavities. Similarly, good sleep hygiene is important in determining the quality and quantity of your sleep. Below are guidelines for good sleep hygiene practices. Review these guidelines and evaluate how well you practice good sleep hygiene. Caffeine:  Avoid Caffeine 6-8 Hours Before Bedtime       Caffeine disturbs sleep, even in people who do not think they experience a stimulation effect. Individuals with insomnia are often more sensitive to mild stimulants than are normal sleepers. Caffeine is found in items such as coffee, tea, soda, chocolate, and many over-the-counter medications (e.g., Excedrin). Thus, drinking caffeinated beverages should be avoided near bedtime and during the night. You might consider a trial period of no caffeine if you tend to be sensitive to its effects. Nicotine:  Avoid Nicotine Before Bedtime       Although some smokers claim that smoking helps them relax, but nicotine is a stimulant. The initial relaxing effects occur with the initial entry of the nicotine, but as the nicotine builds in the system it produces an effect similar to caffeine. Thus, smoking, dipping, or chewing tobacco should be avoided near bedtime and during the night. Dont smoke to get yourself back to sleep. Alcohol:  Avoid Alcohol After Dinner       Alcohol often promotes the onset of sleep, but as alcohol is metabolized sleep becomes disturbed and fragmented.   Thus, a large amount of alcohol is a poor sleep aid and should not be used as such. Limit alcohol use to small quantities to moderate quantities. Sleeping Pills:  Sleep Medications are Effective Only Temporarily       Scientists have shown that sleep medications lose their effectiveness in about 2 - 4 weeks when taken regularly. Despite advertisements to the contrary, over-the-counter sleeping aids have little impact on sleep beyond the placebo effect. Over time, sleeping pills actually can make sleep problems worse. When sleeping pills have been used for a long period, withdrawal from the medication can lead to an insomnia rebound. Thus, after long-term use, many individuals incorrectly conclude that they need sleeping pills in order to sleep normally. Keep use of sleep pills infrequent, but dont worry if you need t use one on an occasional basis. Regular Exercise       Get regular exercise, preferably 40 minutes each day of an activity that causes sweating. .  Exercise in the late afternoon or early evening seems to aid sleep, although the positive effect often takes several weeks to become noticeable. Exercising sporadically is not likely to improve sleep, and exercise within 2 hours of bedtime may elevate nervous system activity and interfere with sleep onset. Hot Baths  Spending 20 minutes in a tub of hot water an hour or two prior to bedtime may promote sleep and is strongly recommended. Bedroom Environment: Moderate Temperature, Quiet, and Dark       Extremes of heat or cold can disrupt sleep. A quiet environment is more sleep promoting than a noisy one. Noises can be masked with background white noise (such as the noise of a fan) or with earplugs. Bedrooms may be darkened with black-out shades or sleep masks can be worn. Position clocks out-of-sight since clock-watching can increase worry about the effects of lack of sleep.   Be sure your mattress is not too soft or too firm and that your pillow is the right height and firmness. Eating       A light bedtime snack, such a glass of warm milk, cheese, or a bowl of cereal can promote sleep. You should avoid the following foods at bedtime:  any caffeinated foods (e.g., chocolate), peanuts, beans, most raw fruits and vegetables (since they may cause gas), and high-fat foods such as potato chips or corn chips. Avoid snacks in the middle of the nights since awakening may become associated with hunger. If you have trouble with regurgitation, be especially careful to avid heavy meals and spices in the evening. Do not go to bed too hungry or too full. It may help to elevate you head with some pillows. Avoid Naps       Avoid naps, the sleep you obtain during the day takes away from you sleep need that night resulting in lighter, more restless sleep, difficulty falling asleep or early morning awakening. If you must nap, keep it brief, and take the nap about 8 hours after arising. It is best to set an alarm to ensure you dont sleep more than 10-15 minutes. Limit Your Time in Bed        Restrict your sleep period to the average number of hours you have actually slept per night during the preceding week. Quality of sleep is important. Too much time in bed can decrease the quality on subsequent night and contribute to the maintenance of existing sleep problems. Dont lay in bed for extended times not sleep. If you arent asleep in about 15-20 minutes go ahead and get up. Do something outside the bedroom that is relaxing. When you feel sleepy (i.e., yawning, head bobbing, eyes closing, concentration decreasing, then return to bed. Dont confuse tiredness with sleepiness, they are different. Tiredness doesnt lead to sleep, only sleepiness does. Regular Sleep Schedule       Keep a regular time each day, 7 days a week, to get out of bed.   Keeping a regular awaking time helps set your circadian rhythm set so that your body learns to sleep at the desired time. Use the attached form to develop a plan for improving you sleep hygiene. It will take time for you sleep to get back in line so once you begin your sleep hygiene plan, stick with if for at least 6-8 weeks. Planned Improvements of My Sleep Hygiene    Check Those  That Apply  _____ Avoid Caffeine 6-8 Hours Before Bedtime. I will not have caffeine after ________ PM.    ____ Avoid Nicotine Before Bedtime. I will not have a cigarette after _________ PM.    ______  Limit Alcohol Use. I will not have more than _______ drinks in the evening.    ______ Avoid Use of Sleeping Pills. (If you are currently using them regularly, all changes should be   medical supervised by your medical provider). ______ Do Exercise Regularly, But Not Within 2 Hours of Bedtime. I ________________ for ____   minutes, on the following days ____________________________________________________    ______ Ensure your Bedroom is a Comfortable Temperature, Quiet, and Dark and Your   Mattress and Pillow are good. I will make the  following changes to my bedroom   ____________________________________________________________________________    ______ Do Take a Hot Bath 1-2 Hours Prior to Bedtime. I will take a hot bath about ______ PM.    ______ Eat a Light Snack at Bedtime but Avoid Large or Problematic Foods. I will eat     __________________  or _____________________ or __________________ before bed.    ______ Avoid Naps. I try not to nap, if I must, I will limit it to _______ minutes, about 8 hours after I   awoke and will use alarm to limit my nap time. ______ Limit Time In Bed. I have been sleeping on average ______ hours per night, therefore I will   limit my time in bed to _____ hours (the same number).   If Im not asleep in about 15 to 20   minutes I will get up and not return to bed until Im sleepy.    ______ Stay on a Regular Sleep Schedule  I will get up at _______ AM, 7 days a week, no matter   how poorly I slept that night. Cognitive Distortions Checklist    1. All-or-nothing thinking: You look at things in absolute, black-and-white categories. 2. Over-generalization: You view a negative event as a never-ending pattern of defeat. 3. Mental filter: You dwell on the negatives. 4. Discounting the positives: You insist that your accomplishments or positive qualities don't count. 5. Jumping to conclusions:      (a) Mind-reading: You assume that people are reacting negatively to you when  there's no definitive evidence to support this;     (b) Fortune-telling: You arbitrarily predict that things will turn out badly    6. Magnification or minimization: You blow things way out of proportion or you shrink their importance. 7. Emotional reasoning: You reason from how you feel: \"I feel like an idiot, so I really must be one. \"    8. \"Should\" statements: You criticize yourself (or other people) with \"shoulds,\" \"oughts,\" \"musts,\" and \"have tos. \"    9. Labeling: Instead of saying \"I made a mistake,\" you tell yourself, \"I'm a jerk\" or \"a fool\" or \"a loser. \"    10. Personalization and blame: You blame yourself for something you weren't entirely responsible for, or you blame other people and deny your role in the problem. 11. Catastrophizing: You assume the worst case scenario will happen. Self-Defeating Beliefs    1. Emotional Perfectionism. \"I should always feel happy, confident, and in control of my emotions. \"    2. Emotophobia. \"I should never feel angry, anxious, inadequate, jealous, or vulnerable. \"    3. Conflict Phobia. \"People who love each other shouldn't fight. \"    4. Entitlement. \"Peole should be the way I expect them to be. \"    5. Low Frustration Tolerance. \"I should never be frustrated. Life should be easy. \"    6. Performance Perfectionism. \"I must never fail or make a mistake. \"    7. Perceived Perfectionism.  \"People will not love and accept me as a flawed and vulnerable human being.\"    8. Fear of Failure. \"My worthwhileness depends on my achievements (or my intelligence, or status, or attractiveness). \"    9. Fear of Disapproval or Criticism. \"I need everybody's approval to be worthwhile. \"    10. Fear of Rejection or Being Alone. \"If I'm alone, then I'm bound to feel miserable and unfulfilled. If I'm not loved, then life is not worth living. \"        15 Ways to Untwist Your Thinking    1. Identify the Distortions. Write down the negative thought and the distortions in each negative thought using the Cognitive Distortions Checklist.    2. The Straight-Forward Approach. Substitute a more positive and realistic thought. 3. The Cost-Benefit Analysis. List the advantages and disadvantages of a negative feeling, thought, belief or behavior. 4. Examine the Evidence. Instead of assuming that a negative thought is true, examine the actual evidence for it. 5. The Survey Method. Do a survey to find out if your thoughts and attitudes are realistic. 6. The Experimental Technique. Do an experiment to test the validity of your negative thought. 7. The Double-Standard Technique. Talk to yourself in the same compassionate way you might talk to a dear friend who was upset. 8. The Pleasure Predicting Method. Predict how satisfying activities will be from 0% to 100%. Record how satisfying they turn out. 9. The Vertical Arrow Technique. Draw a vertical arrow under your negative thought and ask why it would be upsetting if it were true. 10. Thinking in Shade of Gray. Instead of thinking about your problems in black-or-white categories, evaluate things in shades of gray. 11. Define Terms. When you label yourself as \"inferior\" or \"a loser,\" ask yourself what you mean by these labels. 12. Be Specific. Stick with reality and avoid judgments about reality. 13. The Semantic Method.  Substitute language that is less emotionally loaded (useful for \"should\" statements and labeling). 14. Re-Attribution. Instead of blaming yourself for a problem, think about all the factors that may have contributed to it. 15. The Acceptance Paradox. Instead of defending yourself against your own self-criticisms, find truth in them and accept them.

## 2023-02-28 NOTE — PROGRESS NOTES
Behavioral Health Consultation  PAT BECKHAM Psy.D. Psychologist  2/28/2023  8:05 AM EST      Time spent with Patient: 25 minutes  This is patient's second  [facility] appointment. Reason for Consult:    Chief Complaint   Patient presents with    Anxiety     Referring Provider: Víctor Saini MD  [id number] [phone]    Feedback given to PCP: not indicated at this time. S:  Pt reported she accepted a job in [employer] [facility] and will be moving there late April or early May. Nervous about resignation process. Having some guilt. Denied any panic attacks since last visit. Has practiced box breathing exercises. Noted sleep has improved. Some difficulties staying asleep one night in [place]. Reported increased anxiety one day when in [place]. Unsure what precipitated anxious sx. Reported it was hot outside and she had caffeine that morning. O:  MSE:    Appearance: good hygiene   Attitude: cooperative and friendly  Consciousness: alert  Orientation: oriented to person, place, time, general circumstance  Memory: recent and remote memory intact  Attention/Concentration: intact during session  Psychomotor Activity:normal  Eye Contact: normal  Speech: normal rate and volume, well-articulated  Mood: \"good\"  Affect: euthymic  Perception: within normal limits  Thought Content:  some anxious content  Thought Process: logical, coherent and goal-directed  Insight: good  Judgment: intact  Ability to understand instructions: Yes  Ability to respond meaningfully: Yes  Morbid Ideation: no   Suicide Assessment: no suicidal ideation, plan, or intent  Homicidal Ideation: no    History:    Medications:   Current Outpatient Medications   Medication Sig Dispense Refill    SUMAtriptan (IMITREX) 100 MG tablet Take 1 tablet at first sign of headache. May repeat in 2 hr if headache recurs.   Maximum dose- 2 tablets/24 hr. (Patient not taking: Reported on 1/23/2023) 9 tablet 0    CRISTOBAL 0.25-35 MG-MCG per tablet Take 1 tablet by mouth daily (Patient not taking: Reported on 1/23/2023) 84 tablet 3    fluticasone (FLONASE) 50 MCG/ACT nasal spray SPRAY 2 SPRAYS BY NASAL ROUTE EVERY DAY (Patient not taking: Reported on 1/23/2023) 16 g 3    STELARA 90 MG/ML SOSY prefilled syringe  (Patient not taking: Reported on 1/23/2023)       No current facility-administered medications for this visit. Social History:   Social History     Socioeconomic History    Marital status:      Spouse name: Not on file    Number of children: 0    Years of education: Not on file    Highest education level: Not on file   Occupational History    Occupation: working in [employer]   Tobacco Use    Smoking status: Never    Smokeless tobacco: Never   Vaping Use    Vaping Use: Never used   Substance and Sexual Activity    Alcohol use: Not Currently    Drug use: Never    Sexual activity: Yes     Partners: Male   Other Topics Concern    Not on file   Social History Narrative    Met  at [facility] Strain: Low Risk     Difficulty of Paying Living Expenses: Not hard at all   Food Insecurity: No Food Insecurity    Worried About Running Out of Food in the Last Year: Never true    Ran Out of Food in the Last Year: Never true   Transportation Needs: Not on file   Physical Activity: Not on file   Stress: Not on file   Social Connections: Not on file   Intimate Partner Violence: Not on file   Housing Stability: Not on file     TOBACCO:   reports that she has never smoked. She has never used smokeless tobacco.  ETOH:   reports that she does not currently use alcohol. Family History:   Family History   Problem Relation Age of Onset    Rectal Cancer Mother         dx'd late 45s    Pancreatic Cancer Maternal Grandmother     Lung Cancer Paternal Grandfather     Diabetes type 2  Paternal Grandfather        A:  Patient engaged and cooperative. Denied suicidal ideation. Insight and motivation are good. Re-administered PHQ-9 and RE-7 (see below). Patient endorses Minimal symptoms of depression and Minimal symptoms of anxiety. PHQ-9 Questionaire 2/28/2023 2/14/2023 1/13/2023 4/7/2022 3/25/2021   Little interest or pleasure in doing things 0 0 0 0 0   Feeling down, depressed, or hopeless 0 0 0 0 0   Trouble falling or staying asleep, or sleeping too much 1 1 - - -   Feeling tired or having little energy 0 0 - - -   Poor appetite or overeating 0 0 - - -   Feeling bad about yourself - or that you are a failure or have let yourself or your family down 0 1 - - -   Trouble concentrating on things, such as reading the newspaper or watching television 0 0 - - -   Moving or speaking so slowly that other people could have noticed.  Or the opposite - being so fidgety or restless that you have been moving around a lot more than usual 0 0 - - -   Thoughts that you would be better off dead, or of hurting yourself in some way 0 1 - - -   PHQ-9 Total Score 1 3 0 0 0   If you checked off any problems, how difficult have these problems made it for you to do your work, take care of things at home, or get along with other people? 0 0 - - -     PHQ Scores 2/28/2023 2/14/2023 1/13/2023 4/7/2022 3/25/2021   PHQ2 Score 0 0 0 0 0   PHQ9 Score 1 3 0 0 0       Interpretation of Total Score Depression Severity: 1-4 = Minimal depression, 5-9 = Mild depression, 10-14 = Moderate depression, 15-19 = Moderately severe depression, 20-27 = Severe depression     RE-7 SCREENING 2/28/2023 2/14/2023   Feeling nervous, anxious, or on edge Several days Several days   Not being able to stop or control worrying Not at all Nearly every day   Worrying too much about different things Not at all Not at all   Trouble relaxing Not at all Not at all   Being so restless that it is hard to sit still Not at all Not at all   Becoming easily annoyed or irritable Not at all Several days   Feeling afraid as if something awful might happen Not at all Not at all RE-7 Total Score 1 5   How difficult have these problems made it for you to do your work, take care of things at home, or get along with other people? Not difficult at all Not difficult at all     RE 7 SCORE 2/28/2023 2/14/2023   RE-7 Total Score 1 5       Interpretation of Total Score. Anxiety Severity: Score 0-4: Minimal Anxiety. Score 5-9: Mild Anxiety. Score 10-14: Moderate Anxiety. Score greater than 15: Severe Anxiety. Diagnosis:    1. Anxiety    2. Panic attacks        Past Medical History      Diagnosis Date    Crohn's disease of colon with complication ([facility] [address]     dxd 10/13, currently on stelara       Plan:  Pt interventions:  Trained in strategies for increasing balanced thinking, Trained in relaxation strategies, Supportive techniques, Emphasized self-care as important for managing overall health, Provided Psychoeducation re: CBT, Identified maladaptive thoughts, and Emphasized importance of regular practice of relaxation strategies to target / promote anxiety/panic management    Pt Behavioral Change Plan:   See Pt Instructions.

## 2023-03-14 ENCOUNTER — OFFICE VISIT (OUTPATIENT)
Dept: PSYCHOLOGY | Age: 28
End: 2023-03-14
Payer: COMMERCIAL

## 2023-03-14 DIAGNOSIS — F41.9 ANXIETY: Primary | ICD-10-CM

## 2023-03-14 DIAGNOSIS — F41.0 PANIC ATTACKS: ICD-10-CM

## 2023-03-14 PROCEDURE — 90832 PSYTX W PT 30 MINUTES: CPT

## 2023-03-14 PROCEDURE — 1036F TOBACCO NON-USER: CPT

## 2023-03-14 ASSESSMENT — PATIENT HEALTH QUESTIONNAIRE - PHQ9
SUM OF ALL RESPONSES TO PHQ QUESTIONS 1-9: 1
3. TROUBLE FALLING OR STAYING ASLEEP: 1
SUM OF ALL RESPONSES TO PHQ9 QUESTIONS 1 & 2: 0
SUM OF ALL RESPONSES TO PHQ QUESTIONS 1-9: 1
9. THOUGHTS THAT YOU WOULD BE BETTER OFF DEAD, OR OF HURTING YOURSELF: 0
8. MOVING OR SPEAKING SO SLOWLY THAT OTHER PEOPLE COULD HAVE NOTICED. OR THE OPPOSITE, BEING SO FIGETY OR RESTLESS THAT YOU HAVE BEEN MOVING AROUND A LOT MORE THAN USUAL: 0
4. FEELING TIRED OR HAVING LITTLE ENERGY: 0
SUM OF ALL RESPONSES TO PHQ QUESTIONS 1-9: 1
5. POOR APPETITE OR OVEREATING: 0
SUM OF ALL RESPONSES TO PHQ QUESTIONS 1-9: 1
2. FEELING DOWN, DEPRESSED OR HOPELESS: 0
1. LITTLE INTEREST OR PLEASURE IN DOING THINGS: 0
6. FEELING BAD ABOUT YOURSELF - OR THAT YOU ARE A FAILURE OR HAVE LET YOURSELF OR YOUR FAMILY DOWN: 0
7. TROUBLE CONCENTRATING ON THINGS, SUCH AS READING THE NEWSPAPER OR WATCHING TELEVISION: 0
10. IF YOU CHECKED OFF ANY PROBLEMS, HOW DIFFICULT HAVE THESE PROBLEMS MADE IT FOR YOU TO DO YOUR WORK, TAKE CARE OF THINGS AT HOME, OR GET ALONG WITH OTHER PEOPLE: 0

## 2023-03-14 ASSESSMENT — ANXIETY QUESTIONNAIRES
1. FEELING NERVOUS, ANXIOUS, OR ON EDGE: 1
GAD7 TOTAL SCORE: 1
5. BEING SO RESTLESS THAT IT IS HARD TO SIT STILL: 0
7. FEELING AFRAID AS IF SOMETHING AWFUL MIGHT HAPPEN: 0
2. NOT BEING ABLE TO STOP OR CONTROL WORRYING: 0
6. BECOMING EASILY ANNOYED OR IRRITABLE: 0
3. WORRYING TOO MUCH ABOUT DIFFERENT THINGS: 0
4. TROUBLE RELAXING: 0
IF YOU CHECKED OFF ANY PROBLEMS ON THIS QUESTIONNAIRE, HOW DIFFICULT HAVE THESE PROBLEMS MADE IT FOR YOU TO DO YOUR WORK, TAKE CARE OF THINGS AT HOME, OR GET ALONG WITH OTHER PEOPLE: NOT DIFFICULT AT ALL

## 2023-03-14 NOTE — PATIENT INSTRUCTIONS
Return to see Dr. Talha Gorman in 2-4 weeks  Continue practicing relaxation strategies to aid in anxiety/stress  Continue to balance thinking to aid in anxiety   4. Limit Your Time in Bed        Restrict your sleep period to the average number of hours you have actually slept per night during the preceding week. Quality of sleep is important. Too much time in bed can decrease the quality on subsequent night and contribute to the maintenance of existing sleep problems. Dont lay in bed for extended times not sleep. If you arent asleep in about 15-20 minutes go ahead and get up. Do something outside the bedroom that is relaxing. When you feel sleepy (i.e., yawning, head bobbing, eyes closing, concentration decreasing, then return to bed. Dont confuse tiredness with sleepiness, they are different. Tiredness doesnt lead to sleep, only sleepiness does. 5.   Review handout below      STRESS MANAGEMENT:  IDENTIFYING SELF TALK      General Questions    What are my thoughts here? What have I been or am I saying to myself? How am I thinking about the situation ? Questions to Identify Expectations    What am I thinking or expecting of myself here? What am I thinking or expecting of others here? How am I thinking or expecting the world to treat me? What Common Unhealthy Beliefs could be stimulating my reaction? What significance am I telling myself this situation could have? What do I think might be at stake here? Questions to Identify Predictions    What am I thinking is going to or might happen because of this situation? Am I thinking any What ifs from this situation?   What am I telling myself this situation might mean for my future? What am I predicting others might do because of this situation? What am I predicting I might do because of this situation? Questions to Identify Evaluations    How wonderful - awful am I rating this event?   How wonderful - awful am I rating myself because of this situation? How wonderful - awful am I rating others because of this situation? If my predictions occur, how wonderful - awful do I think that will be? If my predictions dont occur, how wonderful - awful do I think that will be?

## 2023-03-14 NOTE — PROGRESS NOTES
Behavioral Health Consultation  PAT BECKHAM Psy.D. Psychologist  3/14/2023  8:02 AM EDT      Time spent with Patient: 30 minutes  This is patient's third  [facility] appointment. Reason for Consult:    Chief Complaint   Patient presents with    Anxiety     Referring Provider: Yunier Sanchez MD  [address] [id number]    Feedback given to PCP: not indicated at this time. S:  Pt reported she has been stressed preparing for the move to [facility]. Starts job March 20th. Feels she is managing stress well. Has felt overall happier taking new position. Pt denied panic attacks since last visit. Some nervousness, heart racing before a show she attended. Was able to use cognitive strategies to aid in anxiety. Endorsed some trouble initiating sleep. Denied issues staying asleep. Reviewed sleep restriction. O:  MSE:    Appearance: good hygiene   Attitude: cooperative and friendly  Consciousness: alert  Orientation: oriented to person, place, time, general circumstance  Memory: recent and remote memory intact  Attention/Concentration: intact during session  Psychomotor Activity:normal  Eye Contact: normal  Speech: normal rate and volume, well-articulated  Mood: \"stressed but happier\"  Affect: euthymic  Perception: within normal limits  Thought Content: within normal limits  Thought Process: logical, coherent and goal-directed  Insight: good  Judgment: intact  Ability to understand instructions: Yes  Ability to respond meaningfully: Yes  Morbid Ideation: no   Suicide Assessment: no suicidal ideation, plan, or intent  Homicidal Ideation: no    History:    Medications:   Current Outpatient Medications   Medication Sig Dispense Refill    SUMAtriptan (IMITREX) 100 MG tablet Take 1 tablet at first sign of headache. May repeat in 2 hr if headache recurs.   Maximum dose- 2 tablets/24 hr. (Patient not taking: Reported on 1/23/2023) 9 tablet 0    CRISTOBAL 0.25-35 MG-MCG per tablet Take 1 tablet by mouth daily (Patient not taking: Reported on 1/23/2023) 84 tablet 3    fluticasone (FLONASE) 50 MCG/ACT nasal spray SPRAY 2 SPRAYS BY NASAL ROUTE EVERY DAY (Patient not taking: Reported on 1/23/2023) 16 g 3    STELARA 90 MG/ML SOSY prefilled syringe  (Patient not taking: Reported on 1/23/2023)       No current facility-administered medications for this visit. Social History:   Social History     Socioeconomic History    Marital status:      Spouse name: Not on file    Number of children: 0    Years of education: Not on file    Highest education level: Not on file   Occupational History    Occupation: working in [employer]   Tobacco Use    Smoking status: Never    Smokeless tobacco: Never   Vaping Use    Vaping Use: Never used   Substance and Sexual Activity    Alcohol use: Not Currently    Drug use: Never    Sexual activity: Yes     Partners: Male   Other Topics Concern    Not on file   Social History Narrative    Met  at [facility] Strain: Low Risk     Difficulty of Paying Living Expenses: Not hard at all   Food Insecurity: No Food Insecurity    Worried About Running Out of Food in the Last Year: Never true    Ran Out of Food in the Last Year: Never true   Transportation Needs: Not on file   Physical Activity: Not on file   Stress: Not on file   Social Connections: Not on file   Intimate Partner Violence: Not on file   Housing Stability: Not on file     TOBACCO:   reports that she has never smoked. She has never used smokeless tobacco.  ETOH:   reports that she does not currently use alcohol. Family History:   Family History   Problem Relation Age of Onset    Rectal Cancer Mother         dx'd late 45s    Pancreatic Cancer Maternal Grandmother     Lung Cancer Paternal Grandfather     Diabetes type 2  Paternal Grandfather        A:  Patient engaged and cooperative. Denied suicidal ideation. Insight and motivation are good.      Re-administered PHQ-9 and RE-7 (see below). Patient endorses Minimal symptoms of depression and Minimal symptoms of anxiety. PHQ-9 Questionaire 3/14/2023 2/28/2023 2/14/2023 1/13/2023 4/7/2022 3/25/2021   Little interest or pleasure in doing things 0 0 0 0 0 0   Feeling down, depressed, or hopeless 0 0 0 0 0 0   Trouble falling or staying asleep, or sleeping too much 1 1 1 - - -   Feeling tired or having little energy 0 0 0 - - -   Poor appetite or overeating 0 0 0 - - -   Feeling bad about yourself - or that you are a failure or have let yourself or your family down 0 0 1 - - -   Trouble concentrating on things, such as reading the newspaper or watching television 0 0 0 - - -   Moving or speaking so slowly that other people could have noticed.  Or the opposite - being so fidgety or restless that you have been moving around a lot more than usual 0 0 0 - - -   Thoughts that you would be better off dead, or of hurting yourself in some way 0 0 1 - - -   PHQ-9 Total Score 1 1 3 0 0 0   If you checked off any problems, how difficult have these problems made it for you to do your work, take care of things at home, or get along with other people? 0 0 0 - - -     PHQ Scores 3/14/2023 2/28/2023 2/14/2023 1/13/2023 4/7/2022 3/25/2021   PHQ2 Score 0 0 0 0 0 0   PHQ9 Score 1 1 3 0 0 0       Interpretation of Total Score Depression Severity: 1-4 = Minimal depression, 5-9 = Mild depression, 10-14 = Moderate depression, 15-19 = Moderately severe depression, 20-27 = Severe depression     RE-7 SCREENING 3/14/2023 2/28/2023 2/14/2023   Feeling nervous, anxious, or on edge Several days Several days Several days   Not being able to stop or control worrying Not at all Not at all Nearly every day   Worrying too much about different things Not at all Not at all Not at all   Trouble relaxing Not at all Not at all Not at all   Being so restless that it is hard to sit still Not at all Not at all Not at all   Becoming easily annoyed or irritable Not at all Not at all Several days   Feeling afraid as if something awful might happen Not at all Not at all Not at all   RE-7 Total Score 1 1 5   How difficult have these problems made it for you to do your work, take care of things at home, or get along with other people? Not difficult at all Not difficult at all Not difficult at all     RE 7 SCORE 3/14/2023 2/28/2023 2/14/2023   RE-7 Total Score 1 1 5       Interpretation of Total Score. Anxiety Severity: Score 0-4: Minimal Anxiety. Score 5-9: Mild Anxiety. Score 10-14: Moderate Anxiety. Score greater than 15: Severe Anxiety. Diagnosis:    1. Anxiety    2. Panic attacks        Past Medical History      Diagnosis Date    Crohn's disease of colon with complication ([address]     dxd 10/13, currently on stelara       Plan:  Pt interventions:  Trained in strategies for increasing balanced thinking, Discussed self-care (sleep, nutrition, rewarding activities, social support, exercise), Supportive techniques, Emphasized self-care as important for managing overall health, and Emphasized importance of regular practice of relaxation strategies to target / promote anxiety and stress management    Pt Behavioral Change Plan:   See Pt Instructions.